# Patient Record
Sex: FEMALE | Race: WHITE | NOT HISPANIC OR LATINO
[De-identification: names, ages, dates, MRNs, and addresses within clinical notes are randomized per-mention and may not be internally consistent; named-entity substitution may affect disease eponyms.]

---

## 2017-04-04 ENCOUNTER — APPOINTMENT (OUTPATIENT)
Dept: ORTHOPEDIC SURGERY | Facility: CLINIC | Age: 52
End: 2017-04-04

## 2017-04-04 VITALS
HEIGHT: 62 IN | WEIGHT: 126 LBS | SYSTOLIC BLOOD PRESSURE: 159 MMHG | BODY MASS INDEX: 23.19 KG/M2 | HEART RATE: 76 BPM | DIASTOLIC BLOOD PRESSURE: 106 MMHG

## 2017-04-04 DIAGNOSIS — M25.571 PAIN IN RIGHT ANKLE AND JOINTS OF RIGHT FOOT: ICD-10-CM

## 2017-04-04 DIAGNOSIS — M21.6X9 OTHER ACQUIRED DEFORMITIES OF UNSPECIFIED FOOT: ICD-10-CM

## 2017-04-17 ENCOUNTER — OUTPATIENT (OUTPATIENT)
Dept: OUTPATIENT SERVICES | Facility: HOSPITAL | Age: 52
LOS: 1 days | End: 2017-04-17
Payer: MEDICARE

## 2017-04-17 ENCOUNTER — APPOINTMENT (OUTPATIENT)
Dept: MRI IMAGING | Facility: CLINIC | Age: 52
End: 2017-04-17

## 2017-04-17 DIAGNOSIS — M25.571 PAIN IN RIGHT ANKLE AND JOINTS OF RIGHT FOOT: ICD-10-CM

## 2017-04-17 PROCEDURE — 73721 MRI JNT OF LWR EXTRE W/O DYE: CPT

## 2017-05-02 ENCOUNTER — APPOINTMENT (OUTPATIENT)
Dept: ORTHOPEDIC SURGERY | Facility: CLINIC | Age: 52
End: 2017-05-02

## 2017-05-16 ENCOUNTER — OUTPATIENT (OUTPATIENT)
Dept: OUTPATIENT SERVICES | Facility: HOSPITAL | Age: 52
LOS: 1 days | End: 2017-05-16
Payer: MEDICARE

## 2017-05-16 ENCOUNTER — APPOINTMENT (OUTPATIENT)
Dept: RADIOLOGY | Facility: CLINIC | Age: 52
End: 2017-05-16

## 2017-05-16 DIAGNOSIS — M25.50 PAIN IN UNSPECIFIED JOINT: ICD-10-CM

## 2017-05-16 DIAGNOSIS — R53.82 CHRONIC FATIGUE, UNSPECIFIED: ICD-10-CM

## 2017-05-16 DIAGNOSIS — M79.7 FIBROMYALGIA: ICD-10-CM

## 2017-05-16 DIAGNOSIS — R74.8 ABNORMAL LEVELS OF OTHER SERUM ENZYMES: ICD-10-CM

## 2017-05-16 DIAGNOSIS — M15.0 PRIMARY GENERALIZED (OSTEO)ARTHRITIS: ICD-10-CM

## 2017-05-16 PROCEDURE — 77080 DXA BONE DENSITY AXIAL: CPT

## 2017-05-25 ENCOUNTER — APPOINTMENT (OUTPATIENT)
Dept: ORTHOPEDIC SURGERY | Facility: CLINIC | Age: 52
End: 2017-05-25

## 2017-05-25 VITALS
DIASTOLIC BLOOD PRESSURE: 98 MMHG | SYSTOLIC BLOOD PRESSURE: 146 MMHG | HEART RATE: 86 BPM | WEIGHT: 125 LBS | BODY MASS INDEX: 23 KG/M2 | HEIGHT: 62 IN

## 2017-05-25 DIAGNOSIS — M19.012 PRIMARY OSTEOARTHRITIS, LEFT SHOULDER: ICD-10-CM

## 2017-09-26 ENCOUNTER — APPOINTMENT (OUTPATIENT)
Dept: ULTRASOUND IMAGING | Facility: CLINIC | Age: 52
End: 2017-09-26
Payer: MEDICARE

## 2017-10-17 ENCOUNTER — APPOINTMENT (OUTPATIENT)
Dept: ULTRASOUND IMAGING | Facility: CLINIC | Age: 52
End: 2017-10-17
Payer: MEDICARE

## 2017-10-17 ENCOUNTER — APPOINTMENT (OUTPATIENT)
Dept: MAMMOGRAPHY | Facility: CLINIC | Age: 52
End: 2017-10-17
Payer: MEDICARE

## 2017-10-17 ENCOUNTER — OUTPATIENT (OUTPATIENT)
Dept: OUTPATIENT SERVICES | Facility: HOSPITAL | Age: 52
LOS: 1 days | End: 2017-10-17
Payer: MEDICARE

## 2017-10-17 DIAGNOSIS — Z12.31 ENCOUNTER FOR SCREENING MAMMOGRAM FOR MALIGNANT NEOPLASM OF BREAST: ICD-10-CM

## 2017-10-17 DIAGNOSIS — R92.2 INCONCLUSIVE MAMMOGRAM: ICD-10-CM

## 2017-10-17 DIAGNOSIS — Z00.8 ENCOUNTER FOR OTHER GENERAL EXAMINATION: ICD-10-CM

## 2017-10-17 PROCEDURE — 76641 ULTRASOUND BREAST COMPLETE: CPT | Mod: 26,50

## 2017-10-17 PROCEDURE — 77063 BREAST TOMOSYNTHESIS BI: CPT | Mod: 26

## 2017-10-17 PROCEDURE — 77063 BREAST TOMOSYNTHESIS BI: CPT

## 2017-10-17 PROCEDURE — G0202: CPT | Mod: 26

## 2017-10-17 PROCEDURE — 77067 SCR MAMMO BI INCL CAD: CPT

## 2017-10-17 PROCEDURE — 76641 ULTRASOUND BREAST COMPLETE: CPT

## 2017-10-23 DIAGNOSIS — Z12.31 ENCOUNTER FOR SCREENING MAMMOGRAM FOR MALIGNANT NEOPLASM OF BREAST: ICD-10-CM

## 2017-10-23 DIAGNOSIS — N63.20 UNSPECIFIED LUMP IN THE LEFT BREAST, UNSPECIFIED QUADRANT: ICD-10-CM

## 2017-10-24 ENCOUNTER — APPOINTMENT (OUTPATIENT)
Dept: ORTHOPEDIC SURGERY | Facility: CLINIC | Age: 52
End: 2017-10-24
Payer: MEDICARE

## 2017-10-24 PROCEDURE — 73610 X-RAY EXAM OF ANKLE: CPT | Mod: RT

## 2017-10-24 PROCEDURE — 99214 OFFICE O/P EST MOD 30 MIN: CPT

## 2017-11-17 ENCOUNTER — OUTPATIENT (OUTPATIENT)
Dept: OUTPATIENT SERVICES | Facility: HOSPITAL | Age: 52
LOS: 1 days | End: 2017-11-17

## 2017-11-17 ENCOUNTER — APPOINTMENT (OUTPATIENT)
Dept: ULTRASOUND IMAGING | Facility: CLINIC | Age: 52
End: 2017-11-17
Payer: MEDICARE

## 2017-11-17 ENCOUNTER — OUTPATIENT (OUTPATIENT)
Dept: OUTPATIENT SERVICES | Facility: HOSPITAL | Age: 52
LOS: 1 days | End: 2017-11-17
Payer: MEDICARE

## 2017-11-17 VITALS
SYSTOLIC BLOOD PRESSURE: 122 MMHG | OXYGEN SATURATION: 96 % | HEART RATE: 89 BPM | DIASTOLIC BLOOD PRESSURE: 78 MMHG | WEIGHT: 117.95 LBS | RESPIRATION RATE: 17 BRPM | HEIGHT: 62 IN | TEMPERATURE: 98 F

## 2017-11-17 DIAGNOSIS — M94.279 CHONDROMALACIA, UNSPECIFIED ANKLE AND JOINTS OF FOOT: ICD-10-CM

## 2017-11-17 DIAGNOSIS — F41.9 ANXIETY DISORDER, UNSPECIFIED: ICD-10-CM

## 2017-11-17 DIAGNOSIS — C44.92 SQUAMOUS CELL CARCINOMA OF SKIN, UNSPECIFIED: Chronic | ICD-10-CM

## 2017-11-17 DIAGNOSIS — R29.898 OTHER SYMPTOMS AND SIGNS INVOLVING THE MUSCULOSKELETAL SYSTEM: Chronic | ICD-10-CM

## 2017-11-17 DIAGNOSIS — M77.50 OTHER ENTHESOPATHY OF UNSPECIFIED FOOT: ICD-10-CM

## 2017-11-17 DIAGNOSIS — Z01.818 ENCOUNTER FOR OTHER PREPROCEDURAL EXAMINATION: ICD-10-CM

## 2017-11-17 DIAGNOSIS — G89.4 CHRONIC PAIN SYNDROME: ICD-10-CM

## 2017-11-17 DIAGNOSIS — M94.20 CHONDROMALACIA, UNSPECIFIED SITE: Chronic | ICD-10-CM

## 2017-11-17 DIAGNOSIS — Z00.8 ENCOUNTER FOR OTHER GENERAL EXAMINATION: ICD-10-CM

## 2017-11-17 DIAGNOSIS — M77.9 ENTHESOPATHY, UNSPECIFIED: Chronic | ICD-10-CM

## 2017-11-17 DIAGNOSIS — N80.0 ENDOMETRIOSIS OF UTERUS: Chronic | ICD-10-CM

## 2017-11-17 DIAGNOSIS — M26.51 ABNORMAL JAW CLOSURE: Chronic | ICD-10-CM

## 2017-11-17 DIAGNOSIS — M24.9 JOINT DERANGEMENT, UNSPECIFIED: ICD-10-CM

## 2017-11-17 DIAGNOSIS — E03.9 HYPOTHYROIDISM, UNSPECIFIED: ICD-10-CM

## 2017-11-17 DIAGNOSIS — Z96.9 PRESENCE OF FUNCTIONAL IMPLANT, UNSPECIFIED: ICD-10-CM

## 2017-11-17 DIAGNOSIS — G47.33 OBSTRUCTIVE SLEEP APNEA (ADULT) (PEDIATRIC): ICD-10-CM

## 2017-11-17 DIAGNOSIS — M77.9 ENTHESOPATHY, UNSPECIFIED: ICD-10-CM

## 2017-11-17 PROCEDURE — 93880 EXTRACRANIAL BILAT STUDY: CPT

## 2017-11-17 PROCEDURE — 93880 EXTRACRANIAL BILAT STUDY: CPT | Mod: 26

## 2017-11-17 PROCEDURE — G0463: CPT

## 2017-11-17 RX ORDER — MILNACIPRAN HYDROCHLORIDE 50 MG/1
1 TABLET, FILM COATED ORAL
Qty: 0 | Refills: 0 | COMMUNITY

## 2017-11-17 RX ORDER — LIDOCAINE HCL 20 MG/ML
0.2 VIAL (ML) INJECTION ONCE
Qty: 0 | Refills: 0 | Status: DISCONTINUED | OUTPATIENT
Start: 2017-11-24 | End: 2017-12-09

## 2017-11-17 RX ORDER — SODIUM CHLORIDE 9 MG/ML
3 INJECTION INTRAMUSCULAR; INTRAVENOUS; SUBCUTANEOUS EVERY 8 HOURS
Qty: 0 | Refills: 0 | Status: DISCONTINUED | OUTPATIENT
Start: 2017-11-24 | End: 2017-12-09

## 2017-11-17 NOTE — H&P PST ADULT - PROBLEM SELECTOR PLAN 1
Operative arthroscopy right ankle potential,   Microfracture technique and open removal of hardware.   Labs in chart   Pt is going for medical eval, call for note

## 2017-11-17 NOTE — H&P PST ADULT - PROBLEM SELECTOR PLAN 4
Continue all meds as ordered by pain management.   Pt reports she had a conversation with her Dr for post op pain management. Call for note. Continue all meds as ordered by pain management.   Pt reports she had a conversation with her Dr for post op pain management. Call for note. Case discussed with Dr Connors, also advised H/O TMJ , upper and lower jaw replacement. (Pt is able to open mouth)

## 2017-11-17 NOTE — H&P PST ADULT - MUSCULOSKELETAL
details… detailed exam ROM intact/normal strength/no calf tenderness no calf tenderness/normal strength/decreased ROM

## 2017-11-17 NOTE — H&P PST ADULT - PMH
Anxiety (ICD9 300.00)    Asthma (ICD9 493.90)  last episode 15 yr ago  Endometritis (ICD9 615.9)    Dot Alexander Infection (ICD9 075)    Fibromyalgia (ICD9 729.1)    Hyperlipemia (ICD9 272.4)    IBS (Irritable Bowel Syndrome) (ICD9 564.1)    Neuropathy (ICD9 355.9)    Osteopenia (ICD9 733.90)    Polyarthralgia (ICD9 719.49)    Raynauds Disease (ICD9 443.0)    Restless Leg Syndrome (ICD9 333.94)    Rheumatoid Arthritis (ICD9 714.0)    Spinal Stenosis (ICD9 724.00)    TMJ Disorder (ICD9 524.60) Anxiety (ICD9 300.00)    Asthma (ICD9 493.90)  last episode 15 yr ago  Bilateral carpal tunnel syndrome    Chronic pain syndrome    CRPS (complex regional pain syndrome), upper limb  left shoulder , elbow  Endometritis (ICD9 615.9)    Dot Alexander Infection (ICD9 075)    Fibromyalgia (ICD9 729.1)    Finger deformity, left  middle finger, s/p injury, stitches  Hyperlipemia (ICD9 272.4)    IBS (Irritable Bowel Syndrome) (ICD9 564.1)    Neuropathy (ICD9 355.9)    OA (osteoarthritis)    STEPHANI on CPAP    Osteopenia (ICD9 733.90)    Paget disease of bone    Polyarthralgia (ICD9 719.49)    Raynauds Disease (ICD9 443.0)    Restless Leg Syndrome (ICD9 333.94)    Rheumatoid Arthritis (ICD9 714.0)    Spinal Stenosis (ICD9 724.00)    TMJ Disorder (ICD9 524.60)

## 2017-11-17 NOTE — H&P PST ADULT - PSH
Arthroscopy  right Knee    Arthroscopy Left Shoulder x 2    Bartholin's Cyst (ICD9 616.2)    Carpal tunnel release    Cervical Spine Fusion    ORIF Right Tib/Fib    S/P Hysterectomy (ICD9 V88.01)    S/P Laminectomy with Spinal Fusion (ICD9 V45.4)    salpingectomy Abnormal function of jaw  s/p Arthrocenteis of right side of jaw 3/2011, Arthroplsty 2012, Removed sponge implants 2012,  Arthrocentesis 7/2012 TMJ open arthplasty w/temporalis with muscle graft ,5/2013 total joint replacement right side of upper and lower jaw  Arthroscopy  right Knee    Arthroscopy Left Shoulder x 2    Bartholin's Cyst (ICD9 616.2)    Cancer of skin, squamous cell  s/p excision floor on bottom of mouth  Carpal tunnel release    Cervical Spine Fusion    Chondromalacia    Disorder of foot  plantis frbroma removed right foot 2011  Endometriosis of cervix    Enthesopathy of foot    ORIF Right Tib/Fib    S/P Hysterectomy (ICD9 V88.01)    S/P Laminectomy with Spinal Fusion (ICD9 V45.4)    salpingectomy

## 2017-11-17 NOTE — H&P PST ADULT - HISTORY OF PRESENT ILLNESS
51 Y/O Female H/O right Tib/Fib , ankle Fx 2008, she fell of a horse. S/P ORIF . Pt reports she has chronic pain right ankle and difficulty walking. Seen by MD. S/P Right lower extremity x-ray. Presents Operative arthroscopy right ankle potential, Microfracture technique and open removal of hardware.

## 2017-11-24 ENCOUNTER — APPOINTMENT (OUTPATIENT)
Dept: ORTHOPEDIC SURGERY | Facility: HOSPITAL | Age: 52
End: 2017-11-24

## 2017-11-24 ENCOUNTER — OUTPATIENT (OUTPATIENT)
Dept: OUTPATIENT SERVICES | Facility: HOSPITAL | Age: 52
LOS: 1 days | End: 2017-11-24
Payer: MEDICARE

## 2017-11-24 VITALS
TEMPERATURE: 99 F | RESPIRATION RATE: 17 BRPM | SYSTOLIC BLOOD PRESSURE: 122 MMHG | WEIGHT: 117.95 LBS | DIASTOLIC BLOOD PRESSURE: 78 MMHG | HEART RATE: 89 BPM | OXYGEN SATURATION: 96 % | HEIGHT: 62 IN

## 2017-11-24 VITALS
HEART RATE: 72 BPM | SYSTOLIC BLOOD PRESSURE: 95 MMHG | DIASTOLIC BLOOD PRESSURE: 62 MMHG | TEMPERATURE: 98 F | OXYGEN SATURATION: 96 % | RESPIRATION RATE: 18 BRPM

## 2017-11-24 DIAGNOSIS — M94.279 CHONDROMALACIA, UNSPECIFIED ANKLE AND JOINTS OF FOOT: ICD-10-CM

## 2017-11-24 DIAGNOSIS — M26.51 ABNORMAL JAW CLOSURE: Chronic | ICD-10-CM

## 2017-11-24 DIAGNOSIS — M77.9 ENTHESOPATHY, UNSPECIFIED: Chronic | ICD-10-CM

## 2017-11-24 DIAGNOSIS — Z96.9 PRESENCE OF FUNCTIONAL IMPLANT, UNSPECIFIED: ICD-10-CM

## 2017-11-24 DIAGNOSIS — M77.50 OTHER ENTHESOPATHY OF UNSPECIFIED FOOT: ICD-10-CM

## 2017-11-24 DIAGNOSIS — M24.9 JOINT DERANGEMENT, UNSPECIFIED: ICD-10-CM

## 2017-11-24 DIAGNOSIS — N80.0 ENDOMETRIOSIS OF UTERUS: Chronic | ICD-10-CM

## 2017-11-24 DIAGNOSIS — R29.898 OTHER SYMPTOMS AND SIGNS INVOLVING THE MUSCULOSKELETAL SYSTEM: Chronic | ICD-10-CM

## 2017-11-24 DIAGNOSIS — M94.20 CHONDROMALACIA, UNSPECIFIED SITE: Chronic | ICD-10-CM

## 2017-11-24 DIAGNOSIS — C44.92 SQUAMOUS CELL CARCINOMA OF SKIN, UNSPECIFIED: Chronic | ICD-10-CM

## 2017-11-24 PROCEDURE — 76000 FLUOROSCOPY <1 HR PHYS/QHP: CPT

## 2017-11-24 PROCEDURE — 20680 REMOVAL OF IMPLANT DEEP: CPT

## 2017-11-24 PROCEDURE — 29897 ANKLE ARTHROSCOPY/SURGERY: CPT | Mod: RT

## 2017-11-24 PROCEDURE — 29881 ARTHRS KNE SRG MNISECTMY M/L: CPT | Mod: RT

## 2017-11-24 RX ORDER — LINACLOTIDE 145 UG/1
1 CAPSULE, GELATIN COATED ORAL
Qty: 0 | Refills: 0 | COMMUNITY

## 2017-11-24 RX ORDER — TRAZODONE HCL 50 MG
0 TABLET ORAL
Qty: 0 | Refills: 0 | COMMUNITY

## 2017-11-24 RX ORDER — HYDROMORPHONE HYDROCHLORIDE 2 MG/ML
1 INJECTION INTRAMUSCULAR; INTRAVENOUS; SUBCUTANEOUS
Qty: 0 | Refills: 0 | COMMUNITY

## 2017-11-24 RX ORDER — LANSOPRAZOLE 15 MG/1
0 CAPSULE, DELAYED RELEASE ORAL
Qty: 0 | Refills: 0 | COMMUNITY

## 2017-11-24 RX ORDER — GABAPENTIN 400 MG/1
1 CAPSULE ORAL
Qty: 0 | Refills: 0 | COMMUNITY

## 2017-11-24 RX ORDER — MILNACIPRAN HYDROCHLORIDE 50 MG/1
1 TABLET, FILM COATED ORAL
Qty: 0 | Refills: 0 | COMMUNITY

## 2017-11-24 RX ORDER — ONDANSETRON 8 MG/1
4 TABLET, FILM COATED ORAL ONCE
Qty: 0 | Refills: 0 | Status: COMPLETED | OUTPATIENT
Start: 2017-11-24 | End: 2017-11-24

## 2017-11-24 RX ORDER — CHOLECALCIFEROL (VITAMIN D3) 125 MCG
1 CAPSULE ORAL
Qty: 0 | Refills: 0 | COMMUNITY

## 2017-11-24 RX ORDER — AZITHROMYCIN 500 MG/1
1 TABLET, FILM COATED ORAL
Qty: 0 | Refills: 0 | COMMUNITY

## 2017-11-24 RX ORDER — LEVOTHYROXINE SODIUM 125 MCG
1 TABLET ORAL
Qty: 0 | Refills: 0 | COMMUNITY

## 2017-11-24 RX ORDER — MONTELUKAST 4 MG/1
1 TABLET, CHEWABLE ORAL
Qty: 0 | Refills: 0 | COMMUNITY

## 2017-11-24 RX ORDER — HYDROMORPHONE HYDROCHLORIDE 2 MG/ML
0.25 INJECTION INTRAMUSCULAR; INTRAVENOUS; SUBCUTANEOUS
Qty: 0 | Refills: 0 | Status: DISCONTINUED | OUTPATIENT
Start: 2017-11-24 | End: 2017-11-24

## 2017-11-24 RX ORDER — ROPINIROLE 8 MG/1
2 TABLET, FILM COATED, EXTENDED RELEASE ORAL
Qty: 0 | Refills: 0 | COMMUNITY

## 2017-11-24 RX ORDER — SULINDAC 200 MG/1
1 TABLET ORAL
Qty: 0 | Refills: 0 | COMMUNITY

## 2017-11-24 RX ORDER — MORPHINE SULFATE 50 MG/1
1 CAPSULE, EXTENDED RELEASE ORAL
Qty: 0 | Refills: 0 | COMMUNITY

## 2017-11-24 RX ORDER — ACETAMINOPHEN 500 MG
1000 TABLET ORAL ONCE
Qty: 0 | Refills: 0 | Status: COMPLETED | OUTPATIENT
Start: 2017-11-24 | End: 2017-11-24

## 2017-11-24 RX ADMIN — Medication 400 MILLIGRAM(S): at 10:50

## 2017-11-24 RX ADMIN — ONDANSETRON 4 MILLIGRAM(S): 8 TABLET, FILM COATED ORAL at 10:24

## 2017-11-24 RX ADMIN — HYDROMORPHONE HYDROCHLORIDE 0.25 MILLIGRAM(S): 2 INJECTION INTRAMUSCULAR; INTRAVENOUS; SUBCUTANEOUS at 10:33

## 2017-11-24 RX ADMIN — HYDROMORPHONE HYDROCHLORIDE 0.25 MILLIGRAM(S): 2 INJECTION INTRAMUSCULAR; INTRAVENOUS; SUBCUTANEOUS at 10:23

## 2017-11-24 NOTE — ASU DISCHARGE PLAN (ADULT/PEDIATRIC). - MEDICATION SUMMARY - MEDICATIONS TO TAKE
I will START or STAY ON the medications listed below when I get home from the hospital:    Marijuana 0.03 liquid 2x day 2-3 puff at bedtime for chronic pain  -- Indication: For home medication    Percocet 5/325 oral tablet  -- 1 tab(s) by mouth every 6 hours MDD:4 tabs  -- Caution federal law prohibits the transfer of this drug to any person other  than the person for whom it was prescribed.  May cause drowsiness.  Alcohol may intensify this effect.  Use care when operating dangerous machinery.  This prescription cannot be refilled.  This product contains acetaminophen.  Do not use  with any other product containing acetaminophen to prevent possible liver damage.  Using more of this medication than prescribed may cause serious breathing problems.    -- Indication: For severe pain    aspirin 81 mg oral tablet  -- 1 tab(s) by mouth once a day  -- Indication: For home medication    sulindac 150 mg oral tablet  -- 1 tab(s) by mouth 2 times a day  -- Indication: For home medication    morphine 60 mg/12 hours oral tablet, extended release  -- 1 tab(s) by mouth every 12 hours  -- Indication: For home medication    HYDROmorphone 8 mg oral tablet  -- 1 tab(s) by mouth 3 times a day  -- Indication: For home medication    gabapentin 600 mg oral tablet  -- 1 tab(s) by mouth 4 times a day  -- Indication: For home medication    milnacipran 100 mg oral tablet  -- 1 tab(s) by mouth once a day  -- Indication: For home medication    traZODone 150 mg oral tablet, extended release  -- orally once a day (at bedtime)  -- Indication: For home medication    Benadryl 25 mg oral tablet  -- 1 tab(s) by mouth once a day, As Needed  -- Indication: For home medication    atorvastatin 80 mg oral tablet  -- 1 tab(s) by mouth once a day  -- Indication: For home medication    rOPINIRole 2 mg oral tablet  -- 2 tab(s) by mouth once a day (at bedtime)  -- Indication: For home medication    ALPRAZolam 1 mg oral tablet  -- 1 tab(s) by mouth 3 times a day  -- Indication: For home medication    Dulera 100 mcg-5 mcg/inh inhalation aerosol  -- 2 puff(s) inhaled 2 times a day, As Needed  -- Indication: For home medication    diclofenac 1% topical gel  -- 2-3 day  -- Indication: For home medication    Linzess 290 mcg oral capsule  -- 1 cap(s) by mouth once a day  -- Indication: For home medication    montelukast 10 mg oral tablet  -- 1 tab(s) by mouth once a day (at bedtime)  -- Indication: For home medication    Prevacid 15 mg oral granule for reconstitution  -- orally once a day, As Needed  -- Indication: For home medication    levothyroxine 25 mcg (0.025 mg) oral tablet  -- 1 tab(s) by mouth once a day  -- Indication: For home medication    Allegra-D 12 Hour 60 mg-120 mg oral tablet, extended release  -- 1 tab(s) by mouth every 12 hours, As Needed  -- Indication: For home medication    Vitamin D3 2000 intl units oral tablet  -- 1 tab(s) by mouth once a day  -- Indication: For home medication

## 2017-11-24 NOTE — BRIEF OPERATIVE NOTE - POST-OP DX
Ankle impingement syndrome, right  11/24/2017    Catherine Beaulieu  Pain from implanted hardware, sequela  11/24/2017    Catherine Beaulieu  Post-traumatic osteoarthritis of right ankle  11/24/2017    Catherine Beaulieu

## 2017-11-24 NOTE — ASU DISCHARGE PLAN (ADULT/PEDIATRIC). - NOTIFY
Numbness, color, or temperature change to extremity/Pain not relieved by Medications/Bleeding that does not stop/Swelling that continues/Persistent Nausea and Vomiting/Inability to Tolerate Liquids or Foods/Fever greater than 101

## 2017-11-24 NOTE — ASU DISCHARGE PLAN (ADULT/PEDIATRIC). - SPECIAL INSTRUCTIONS
You are to be non-weight baring on crutches until you see Dr. Wise in the office in 10-14 days.     You can take Tylenol in addition to Percocet, but are to take NO MORE THAN 8 (325MG TABS) per day while taking Percocet. Percocet contains Tylenol and taking too much can do damage to your liver.    Take medications as prescribed.

## 2017-11-24 NOTE — BRIEF OPERATIVE NOTE - PRE-OP DX
Ankle impingement syndrome, right  11/24/2017    Active  Catherine Lozoya  Post-traumatic osteoarthritis of right ankle  11/24/2017    Catherine Beauleiu

## 2017-11-24 NOTE — ASU PATIENT PROFILE, ADULT - PSH
Abnormal function of jaw  s/p Arthrocenteis of right side of jaw 3/2011, Arthroplsty 2012, Removed sponge implants 2012,  Arthrocentesis 7/2012 TMJ open arthplasty w/temporalis with muscle graft ,5/2013 total joint replacement right side of upper and lower jaw  Arthroscopy  right Knee    Arthroscopy Left Shoulder x 2    Bartholin's Cyst (ICD9 616.2)    Cancer of skin, squamous cell  s/p excision floor on bottom of mouth  Carpal tunnel release    Cervical Spine Fusion    Chondromalacia    Disorder of foot  plantis frbroma removed right foot 2011  Endometriosis of cervix    Enthesopathy of foot    ORIF Right Tib/Fib    S/P Hysterectomy (ICD9 V88.01)    S/P Laminectomy with Spinal Fusion (ICD9 V45.4)    salpingectomy

## 2017-11-24 NOTE — ASU PATIENT PROFILE, ADULT - PMH
Anxiety (ICD9 300.00)    Asthma (ICD9 493.90)  last episode 15 yr ago  Bilateral carpal tunnel syndrome    Chronic pain syndrome    CRPS (complex regional pain syndrome), upper limb  left shoulder , elbow  Endometritis (ICD9 615.9)    Dot Alexander Infection (ICD9 075)    Fibromyalgia (ICD9 729.1)    Finger deformity, left  middle finger, s/p injury, stitches  Hyperlipemia (ICD9 272.4)    IBS (Irritable Bowel Syndrome) (ICD9 564.1)    Neuropathy (ICD9 355.9)    OA (osteoarthritis)    STEPHANI on CPAP    Osteopenia (ICD9 733.90)    Paget disease of bone    Polyarthralgia (ICD9 719.49)    Raynauds Disease (ICD9 443.0)    Restless Leg Syndrome (ICD9 333.94)    Rheumatoid Arthritis (ICD9 714.0)    Spinal Stenosis (ICD9 724.00)    TMJ Disorder (ICD9 524.60)

## 2017-11-24 NOTE — BRIEF OPERATIVE NOTE - PROCEDURE
<<-----Click on this checkbox to enter Procedure Ankle arthroscopy  11/24/2017    Active  [facility]  Removal of hardware from ankle  11/24/2017    Active  [facility]

## 2017-11-27 ENCOUNTER — TRANSCRIPTION ENCOUNTER (OUTPATIENT)
Age: 52
End: 2017-11-27

## 2017-12-05 ENCOUNTER — APPOINTMENT (OUTPATIENT)
Dept: ORTHOPEDIC SURGERY | Facility: CLINIC | Age: 52
End: 2017-12-05
Payer: MEDICARE

## 2017-12-05 DIAGNOSIS — Z96.9 PRESENCE OF FUNCTIONAL IMPLANT, UNSPECIFIED: ICD-10-CM

## 2017-12-05 PROCEDURE — 99024 POSTOP FOLLOW-UP VISIT: CPT

## 2017-12-05 PROCEDURE — 73610 X-RAY EXAM OF ANKLE: CPT | Mod: RT

## 2017-12-13 ENCOUNTER — RX RENEWAL (OUTPATIENT)
Age: 52
End: 2017-12-13

## 2017-12-25 PROBLEM — Z96.9 RETAINED ORTHOPEDIC HARDWARE: Status: ACTIVE | Noted: 2017-10-24

## 2018-01-09 ENCOUNTER — APPOINTMENT (OUTPATIENT)
Dept: ORTHOPEDIC SURGERY | Facility: CLINIC | Age: 53
End: 2018-01-09
Payer: MEDICARE

## 2018-01-09 PROCEDURE — 99024 POSTOP FOLLOW-UP VISIT: CPT

## 2018-01-15 ENCOUNTER — EMERGENCY (EMERGENCY)
Facility: HOSPITAL | Age: 53
LOS: 1 days | Discharge: HOME | End: 2018-01-15
Attending: EMERGENCY MEDICINE | Admitting: EMERGENCY MEDICINE
Payer: MEDICARE

## 2018-01-15 VITALS
HEART RATE: 85 BPM | OXYGEN SATURATION: 98 % | RESPIRATION RATE: 18 BRPM | WEIGHT: 115.96 LBS | DIASTOLIC BLOOD PRESSURE: 66 MMHG | TEMPERATURE: 99 F | SYSTOLIC BLOOD PRESSURE: 114 MMHG

## 2018-01-15 VITALS
DIASTOLIC BLOOD PRESSURE: 75 MMHG | SYSTOLIC BLOOD PRESSURE: 123 MMHG | OXYGEN SATURATION: 99 % | TEMPERATURE: 98 F | RESPIRATION RATE: 18 BRPM | HEART RATE: 81 BPM

## 2018-01-15 DIAGNOSIS — C44.92 SQUAMOUS CELL CARCINOMA OF SKIN, UNSPECIFIED: Chronic | ICD-10-CM

## 2018-01-15 DIAGNOSIS — N80.0 ENDOMETRIOSIS OF UTERUS: Chronic | ICD-10-CM

## 2018-01-15 DIAGNOSIS — M77.9 ENTHESOPATHY, UNSPECIFIED: Chronic | ICD-10-CM

## 2018-01-15 DIAGNOSIS — R29.898 OTHER SYMPTOMS AND SIGNS INVOLVING THE MUSCULOSKELETAL SYSTEM: Chronic | ICD-10-CM

## 2018-01-15 DIAGNOSIS — M26.51 ABNORMAL JAW CLOSURE: Chronic | ICD-10-CM

## 2018-01-15 DIAGNOSIS — M94.20 CHONDROMALACIA, UNSPECIFIED SITE: Chronic | ICD-10-CM

## 2018-01-15 PROCEDURE — 99284 EMERGENCY DEPT VISIT MOD MDM: CPT

## 2018-01-15 PROCEDURE — 99282 EMERGENCY DEPT VISIT SF MDM: CPT

## 2018-01-15 RX ORDER — MOMETASONE FUROATE AND FORMOTEROL FUMARATE DIHYDRATE 200; 5 UG/1; UG/1
2 AEROSOL RESPIRATORY (INHALATION)
Qty: 0 | Refills: 0 | COMMUNITY

## 2018-01-15 RX ORDER — MILNACIPRAN HYDROCHLORIDE 50 MG/1
0 TABLET, FILM COATED ORAL
Qty: 0 | Refills: 0 | COMMUNITY

## 2018-01-15 RX ORDER — FEXOFENADINE HCL AND PSEUDOEPHEDRINE HCI 60; 120 MG/1; MG/1
1 TABLET, EXTENDED RELEASE ORAL
Qty: 0 | Refills: 0 | COMMUNITY

## 2018-01-15 RX ORDER — DIPHENHYDRAMINE HCL 50 MG
1 CAPSULE ORAL
Qty: 0 | Refills: 0 | COMMUNITY

## 2018-01-15 RX ORDER — DICLOFENAC SODIUM 30 MG/G
0 GEL TOPICAL
Qty: 0 | Refills: 0 | COMMUNITY

## 2018-01-15 RX ORDER — ATORVASTATIN CALCIUM 80 MG/1
1 TABLET, FILM COATED ORAL
Qty: 0 | Refills: 0 | COMMUNITY

## 2018-01-15 RX ORDER — ALPRAZOLAM 0.25 MG
1 TABLET ORAL
Qty: 0 | Refills: 0 | COMMUNITY

## 2018-01-15 RX ORDER — ASPIRIN/CALCIUM CARB/MAGNESIUM 324 MG
1 TABLET ORAL
Qty: 0 | Refills: 0 | COMMUNITY

## 2018-01-15 NOTE — ED PROVIDER NOTE - PHYSICAL EXAMINATION
GEN: Well Appearing, Nontoxic, NAD  HEENT: Symm Facies, PERRL, EOMI, MMM, posterior pharynx clear  CV: No JVD/Bruits or stridor;  RRR w/o m/g/r  RESP: CTAB w/o w/r/r  ABD: Soft, nt/nd, +bs, no guarding/rebound, no RUQ tender;  No CVAT  EXT: No lower extremity edema or calf tenderness. WWP, palpable pulses. FROMx4  SKIN: less than 1cm round open wound without pus drainage. no erythema, edema, or warmth to surrounding area. mild ttp of around surrounding wound.   Neuro: Grossly intact, AOX3 with normal speech, CN II-XII intact; Sensation intact, motor 5/5 throughout; gait normal

## 2018-01-15 NOTE — ED ADULT NURSE NOTE - OBJECTIVE STATEMENT
52 y.o. Female presents to the ED for discharge from surgical site on R medial ankle. A&Ox3. Ambulatory to room. Hx osteoporosis, hyperlipidemia, fibromyalgia with chronic pain and fatigue, degenerative arthritis, paget's disease. As per pt, she had surgery on her R ankle on 11/24/17. Pt reports noticing pus out of surgical wound on R medial ankle since yesterday and this morning - cleaned it with "peroxide." Small scab noted on R medial ankle - no pus or bleeding noted. R ankle appears more swollen than L ankle. Tender on R medial ankle. Denies CP, SOB, N/V/D, urinary/bowel complications, fever/chills. Pt is in no current distress. Comfort and safety provided. Will continue to monitor. Awaiting ED MD consult.

## 2018-01-15 NOTE — ED PROVIDER NOTE - OBJECTIVE STATEMENT
53 y/o F pmhx tib/fib ankle fracture 2008 s/p fall off horse repaired with ORIF, 11/24/17 had hardware removed secondary to worsening pain, presenting with concern for area of pus drainage at medial ankle that started last night. Pt states that she has been doing well with decrease of pain since her surgery until last night she noted the opening of a small scab at the medial ankle that she was able to express a large amount of pus from. She reports that she noted a small amount of swelling here as well, though no pus drainage at this time. Denies fevers, chills, nausea, vomiting.

## 2018-01-15 NOTE — ED PROVIDER NOTE - CARE PLAN
Principal Discharge DX:	Wound  Instructions for follow-up, activity and diet:	1. Take ibuprofen or tylenol for pain and keep extremity elevated if you feel it is swollen   2. See Dr. Wise this week for reevaluation   3. Return to the ER for any new or worsening symptoms Principal Discharge DX:	Wound  Assessment and plan of treatment:	1. Take ibuprofen or tylenol for pain and keep extremity elevated if you feel it is swollen   2. See Dr. Wise this week for reevaluation   3. Return to the ER for any new or worsening symptoms

## 2018-01-15 NOTE — CONSULT NOTE ADULT - ASSESSMENT
A/P: 52y Female with right suture abscess    Pain control  fu xray foot/ankle  labs cancelled as per ED  wbat RLE  keep dressing clean dry and intact  remove in 2day w/ dry dressing PRN  no acute surgical intervention  c/w PO abx as per ED  Follow up with Dr. Wise within 1 week, call office for appointment  Ortho stable

## 2018-01-15 NOTE — ED ADULT NURSE REASSESSMENT NOTE - NS ED NURSE REASSESS COMMENT FT1
pt is in no current distress. comfort and safety provided. will continue to monitor. Awaiting pt's ortho callback.

## 2018-01-15 NOTE — ED PROVIDER NOTE - MEDICAL DECISION MAKING DETAILS
51 y/o F s/p ORIF 2008 s/p removal of hardware 11/2017 presenting with small, open wound to medial malleolus without pus drainage or cellulitic surrounding area. No pus expression now. no fever or chills. full ROM of ankle without concern. Will contact patient's orthopedist and reassess.

## 2018-01-15 NOTE — ED PROVIDER NOTE - ATTENDING CONTRIBUTION TO CARE
53 y/o female with a h/o ORIF of her R ankle several years ago, s/p hardware removal in 11/2017 and who presents with a cc of having expressed pus from a scabbed wound on her R medial malleolus. She also reports some pain of the R ankle but has had no associated fever, chills, redness, warmth or decreased ROM. On exam, she appears very well and comfortable. VSs noted, neck supple, lungs CTA, cardiac sounds s/ audible m/r/g, abdomen soft, NT/ND, extremities/skin c/ dry, clean based wound at R medial malleolus s/ surrounding erythema, edema, warmth, tenderness, crepitus or drainage and peripheral pulses intact and symmetric. There is nothing clinically evident at this time to suggest any emergent process, be it infectious, vascular or traumatic. We will reach out to the Orthopedist who referred her to the ED today and treat/dispo accordingly.

## 2018-01-15 NOTE — ED PROVIDER NOTE - PROGRESS NOTE DETAILS
call placed to Dr. Wise to discuss with him plan for patient. pending discussion. -Debbie Bonds PA-C ortho resident came to see patient at bedside, dressed wound and stated nothing needs to be done further at this time. agrees superficial small abscess without cellulitis with no fever or chills, no need for abx therapy. patient will see Dr. Wise this week and is stable for d/c. -Debbie Bonds PA-C ortho resident came to see patient at bedside, dressed wound and stated nothing needs to be done further at this time. agrees superficial small abscess without cellulitis with no fever or chills. recommending clindamycin for a few days for suture abscess. patient will see Dr. Wise this week and is stable for d/c. -Debbie Bonds PA-C

## 2018-01-15 NOTE — ED PROVIDER NOTE - PLAN OF CARE
1. Take ibuprofen or tylenol for pain and keep extremity elevated if you feel it is swollen   2. See Dr. Wise this week for reevaluation   3. Return to the ER for any new or worsening symptoms

## 2018-01-15 NOTE — ED PROVIDER NOTE - NS ED ROS FT
Constitutional: No fever or chills  Eyes: No visual changes, eye pain or redness  HEENT: No throat pain, ear pain, nasal pain. No nose bleeding.  CV: No chest pain or lower extremity edema  Resp: No SOB no cough  GI: No abd pain. No nausea or vomiting. No diarrhea. No constipation.   : No dysuria, hematuria.   MSK: No musculoskeletal pain  Skin: medial ankle with small open wound without redness to skin.   Neuro: No headache. No numbness or tingling. No weakness.

## 2018-01-15 NOTE — CONSULT NOTE ADULT - SUBJECTIVE AND OBJECTIVE BOX
52y Female community ambulatory presents c/o med R ankle wound. pt saw dr ponce in office last week and was told to observe. per pt she was told to come to ER because of worsening wound. she is sp EDYTA right ankle w/ arthroscopy on 11/24/17.  denies fevers or chills or any new injury. Denies numbness/tingling. No other pain/injuries.     PAST MEDICAL & SURGICAL HISTORY:  Paget disease of bone  Finger deformity, left: middle finger, s/p injury, stitches  CRPS (complex regional pain syndrome), upper limb: left shoulder , elbow  STEPHANI on CPAP  Bilateral carpal tunnel syndrome  OA (osteoarthritis)  Chronic pain syndrome  Polyarthralgia (ICD9 719.49)  Endometritis (ICD9 615.9)  Spinal Stenosis (ICD9 724.00)  TMJ Disorder (ICD9 524.60)  Asthma (ICD9 493.90): last episode 15 yr ago  IBS (Irritable Bowel Syndrome) (ICD9 564.1)  Neuropathy (ICD9 355.9)  Raynauds Disease (ICD9 443.0)  Restless Leg Syndrome (ICD9 333.94)  Osteopenia (ICD9 733.90)  Rheumatoid Arthritis (ICD9 714.0)  Dot Alexander Infection (ICD9 075)  Fibromyalgia (ICD9 729.1)  Hyperlipemia (ICD9 272.4)  Anxiety (ICD9 300.00)  Chondromalacia  Enthesopathy of foot  Abnormal function of jaw: s/p Arthrocenteis of right side of jaw 3/2011, Arthroplsty 2012, Removed sponge implants 2012,  Arthrocentesis 7/2012 TMJ open arthplasty w/temporalis with muscle graft ,5/2013 total joint replacement right side of upper and lower jaw  Cancer of skin, squamous cell: s/p excision floor on bottom of mouth  Endometriosis of cervix  Disorder of foot: plantis frbroma removed right foot 2011  Cervical Spine Fusion  S/P Laminectomy with Spinal Fusion (ICD9 V45.4)  ORIF Right Tib/Fib  salpingectomy  S/P Hysterectomy (ICD9 V88.01)  Arthroscopy Left Shoulder x 2  Arthroscopy  right Knee  Carpal tunnel release  Bartholin's Cyst (ICD9 616.2)      MEDICATIONS  (STANDING):    Allergies    adhesives (Other)  all non-steroidal anti-inflammatories (Anaphylaxis)  apple (Anaphylaxis)  erythromycin (Anaphylaxis)  grass, mold, mildew, pollen, weeds, trees (Anaphylaxis)  Keflex (Anaphylaxis)  meclofenamate (Diarrhea)  penicillin (Anaphylaxis)  spider bites, bee stings, any insect bite (Anaphylaxis)  sulfa drugs (Anaphylaxis)  sulfamethoxazole (Anaphylaxis)  tetracycline (Anaphylaxis)    Intolerances      Vital Signs Last 24 Hrs  T(C): 36.9 (01-15-18 @ 10:19), Max: 37.3 (01-15-18 @ 09:52)  T(F): 98.5 (01-15-18 @ 10:19), Max: 99.1 (01-15-18 @ 09:52)  HR: 81 (01-15-18 @ 10:19) (81 - 85)  BP: 123/75 (01-15-18 @ 10:19) (114/66 - 123/75)  BP(mean): --  RR: 18 (01-15-18 @ 10:19) (18 - 18)  SpO2: 99% (01-15-18 @ 10:19) (98% - 99%)    Physical Exam  Gen: Nad  R LE: small superficial wound over medial mal, no exposed bone, no erythema or discharge, no swelling, well healed scar from aforementioned surgery,  no TTP medial/lateral malleolus, no bony ttp elsewhere, no pain with micromotion of ankle, pt able to ambulate on ankle, +ehl/fhl/ta/gs function, dp/pt pulse intact, silt l3-s1, compartments soft/compressive, extremity warm/well perfused    Procedure: - xeroform and Tegaderm dressing placed

## 2018-01-18 ENCOUNTER — APPOINTMENT (OUTPATIENT)
Dept: ORTHOPEDIC SURGERY | Facility: CLINIC | Age: 53
End: 2018-01-18
Payer: MEDICARE

## 2018-01-18 VITALS — HEART RATE: 78 BPM | SYSTOLIC BLOOD PRESSURE: 135 MMHG | DIASTOLIC BLOOD PRESSURE: 72 MMHG

## 2018-01-18 PROCEDURE — 99024 POSTOP FOLLOW-UP VISIT: CPT

## 2018-01-18 PROCEDURE — 73610 X-RAY EXAM OF ANKLE: CPT | Mod: RT

## 2018-01-30 ENCOUNTER — APPOINTMENT (OUTPATIENT)
Dept: ORTHOPEDIC SURGERY | Facility: CLINIC | Age: 53
End: 2018-01-30
Payer: MEDICARE

## 2018-01-30 DIAGNOSIS — M94.279 CHONDROMALACIA, UNSPECIFIED ANKLE AND JOINTS OF FOOT: ICD-10-CM

## 2018-01-30 DIAGNOSIS — S91.001D UNSPECIFIED OPEN WOUND, RIGHT ANKLE, SUBSEQUENT ENCOUNTER: ICD-10-CM

## 2018-01-30 PROCEDURE — 99024 POSTOP FOLLOW-UP VISIT: CPT

## 2018-03-27 ENCOUNTER — APPOINTMENT (OUTPATIENT)
Dept: ORTHOPEDIC SURGERY | Facility: CLINIC | Age: 53
End: 2018-03-27
Payer: MEDICARE

## 2018-03-27 DIAGNOSIS — M24.9 JOINT DERANGEMENT, UNSPECIFIED: ICD-10-CM

## 2018-03-27 DIAGNOSIS — M25.871 OTHER SPECIFIED JOINT DISORDERS, RIGHT ANKLE AND FOOT: ICD-10-CM

## 2018-03-27 PROCEDURE — 99213 OFFICE O/P EST LOW 20 MIN: CPT

## 2018-04-01 PROBLEM — M24.9 INTERNAL DERANGEMENT OF ANKLE: Status: ACTIVE | Noted: 2017-04-04

## 2018-04-01 PROBLEM — M25.871 IMPINGEMENT SYNDROME OF RIGHT ANKLE: Status: ACTIVE | Noted: 2017-04-04

## 2018-04-19 ENCOUNTER — OUTPATIENT (OUTPATIENT)
Dept: OUTPATIENT SERVICES | Facility: HOSPITAL | Age: 53
LOS: 1 days | End: 2018-04-19
Payer: MEDICARE

## 2018-04-19 ENCOUNTER — APPOINTMENT (OUTPATIENT)
Dept: CT IMAGING | Facility: CLINIC | Age: 53
End: 2018-04-19
Payer: MEDICARE

## 2018-04-19 DIAGNOSIS — N80.0 ENDOMETRIOSIS OF UTERUS: Chronic | ICD-10-CM

## 2018-04-19 DIAGNOSIS — M77.9 ENTHESOPATHY, UNSPECIFIED: Chronic | ICD-10-CM

## 2018-04-19 DIAGNOSIS — R29.898 OTHER SYMPTOMS AND SIGNS INVOLVING THE MUSCULOSKELETAL SYSTEM: Chronic | ICD-10-CM

## 2018-04-19 DIAGNOSIS — C44.92 SQUAMOUS CELL CARCINOMA OF SKIN, UNSPECIFIED: Chronic | ICD-10-CM

## 2018-04-19 DIAGNOSIS — M94.20 CHONDROMALACIA, UNSPECIFIED SITE: Chronic | ICD-10-CM

## 2018-04-19 DIAGNOSIS — M26.51 ABNORMAL JAW CLOSURE: Chronic | ICD-10-CM

## 2018-04-19 DIAGNOSIS — Z00.8 ENCOUNTER FOR OTHER GENERAL EXAMINATION: ICD-10-CM

## 2018-04-19 PROCEDURE — G0297: CPT | Mod: 26

## 2018-04-19 PROCEDURE — G0297: CPT

## 2018-05-31 ENCOUNTER — OUTPATIENT (OUTPATIENT)
Dept: OUTPATIENT SERVICES | Facility: HOSPITAL | Age: 53
LOS: 1 days | End: 2018-05-31
Payer: MEDICARE

## 2018-05-31 ENCOUNTER — APPOINTMENT (OUTPATIENT)
Dept: NUCLEAR MEDICINE | Facility: IMAGING CENTER | Age: 53
End: 2018-05-31
Payer: MEDICARE

## 2018-05-31 DIAGNOSIS — M25.50 PAIN IN UNSPECIFIED JOINT: ICD-10-CM

## 2018-05-31 DIAGNOSIS — N80.0 ENDOMETRIOSIS OF UTERUS: Chronic | ICD-10-CM

## 2018-05-31 DIAGNOSIS — Z00.8 ENCOUNTER FOR OTHER GENERAL EXAMINATION: ICD-10-CM

## 2018-05-31 DIAGNOSIS — R79.89 OTHER SPECIFIED ABNORMAL FINDINGS OF BLOOD CHEMISTRY: ICD-10-CM

## 2018-05-31 DIAGNOSIS — M26.51 ABNORMAL JAW CLOSURE: Chronic | ICD-10-CM

## 2018-05-31 DIAGNOSIS — R29.898 OTHER SYMPTOMS AND SIGNS INVOLVING THE MUSCULOSKELETAL SYSTEM: Chronic | ICD-10-CM

## 2018-05-31 DIAGNOSIS — C44.92 SQUAMOUS CELL CARCINOMA OF SKIN, UNSPECIFIED: Chronic | ICD-10-CM

## 2018-05-31 DIAGNOSIS — M77.9 ENTHESOPATHY, UNSPECIFIED: Chronic | ICD-10-CM

## 2018-05-31 DIAGNOSIS — M94.20 CHONDROMALACIA, UNSPECIFIED SITE: Chronic | ICD-10-CM

## 2018-05-31 DIAGNOSIS — E55.9 VITAMIN D DEFICIENCY, UNSPECIFIED: ICD-10-CM

## 2018-05-31 DIAGNOSIS — M79.7 FIBROMYALGIA: ICD-10-CM

## 2018-05-31 PROCEDURE — 78306 BONE IMAGING WHOLE BODY: CPT

## 2018-05-31 PROCEDURE — A9561: CPT

## 2018-05-31 PROCEDURE — 78306 BONE IMAGING WHOLE BODY: CPT | Mod: 26

## 2018-06-01 ENCOUNTER — OUTPATIENT (OUTPATIENT)
Dept: OUTPATIENT SERVICES | Facility: HOSPITAL | Age: 53
LOS: 1 days | End: 2018-06-01
Payer: MEDICARE

## 2018-06-01 ENCOUNTER — APPOINTMENT (OUTPATIENT)
Dept: CT IMAGING | Facility: CLINIC | Age: 53
End: 2018-06-01
Payer: MEDICARE

## 2018-06-01 DIAGNOSIS — N80.0 ENDOMETRIOSIS OF UTERUS: Chronic | ICD-10-CM

## 2018-06-01 DIAGNOSIS — R63.4 ABNORMAL WEIGHT LOSS: ICD-10-CM

## 2018-06-01 DIAGNOSIS — R29.898 OTHER SYMPTOMS AND SIGNS INVOLVING THE MUSCULOSKELETAL SYSTEM: Chronic | ICD-10-CM

## 2018-06-01 DIAGNOSIS — M94.20 CHONDROMALACIA, UNSPECIFIED SITE: Chronic | ICD-10-CM

## 2018-06-01 DIAGNOSIS — C44.92 SQUAMOUS CELL CARCINOMA OF SKIN, UNSPECIFIED: Chronic | ICD-10-CM

## 2018-06-01 DIAGNOSIS — M26.51 ABNORMAL JAW CLOSURE: Chronic | ICD-10-CM

## 2018-06-01 DIAGNOSIS — M77.9 ENTHESOPATHY, UNSPECIFIED: Chronic | ICD-10-CM

## 2018-06-01 PROCEDURE — 74176 CT ABD & PELVIS W/O CONTRAST: CPT

## 2018-06-01 PROCEDURE — 74176 CT ABD & PELVIS W/O CONTRAST: CPT | Mod: 26

## 2018-06-11 ENCOUNTER — EMERGENCY (EMERGENCY)
Facility: HOSPITAL | Age: 53
LOS: 1 days | Discharge: HOME | End: 2018-06-11
Attending: EMERGENCY MEDICINE
Payer: MEDICARE

## 2018-06-11 VITALS
RESPIRATION RATE: 20 BRPM | DIASTOLIC BLOOD PRESSURE: 76 MMHG | HEART RATE: 69 BPM | SYSTOLIC BLOOD PRESSURE: 139 MMHG | TEMPERATURE: 98 F | OXYGEN SATURATION: 99 %

## 2018-06-11 VITALS
DIASTOLIC BLOOD PRESSURE: 68 MMHG | HEIGHT: 61 IN | SYSTOLIC BLOOD PRESSURE: 126 MMHG | RESPIRATION RATE: 22 BRPM | HEART RATE: 85 BPM | TEMPERATURE: 99 F | OXYGEN SATURATION: 99 % | WEIGHT: 102.07 LBS

## 2018-06-11 DIAGNOSIS — R29.898 OTHER SYMPTOMS AND SIGNS INVOLVING THE MUSCULOSKELETAL SYSTEM: Chronic | ICD-10-CM

## 2018-06-11 DIAGNOSIS — M77.9 ENTHESOPATHY, UNSPECIFIED: Chronic | ICD-10-CM

## 2018-06-11 DIAGNOSIS — M94.20 CHONDROMALACIA, UNSPECIFIED SITE: Chronic | ICD-10-CM

## 2018-06-11 DIAGNOSIS — N80.0 ENDOMETRIOSIS OF UTERUS: Chronic | ICD-10-CM

## 2018-06-11 DIAGNOSIS — C44.92 SQUAMOUS CELL CARCINOMA OF SKIN, UNSPECIFIED: Chronic | ICD-10-CM

## 2018-06-11 DIAGNOSIS — M26.51 ABNORMAL JAW CLOSURE: Chronic | ICD-10-CM

## 2018-06-11 LAB
ALBUMIN SERPL ELPH-MCNC: 4.5 G/DL — SIGNIFICANT CHANGE UP (ref 3.3–5)
ALP SERPL-CCNC: 85 U/L — SIGNIFICANT CHANGE UP (ref 40–120)
ALT FLD-CCNC: 15 U/L — SIGNIFICANT CHANGE UP (ref 10–45)
ANION GAP SERPL CALC-SCNC: 11 MMOL/L — SIGNIFICANT CHANGE UP (ref 5–17)
APPEARANCE UR: CLEAR — SIGNIFICANT CHANGE UP
AST SERPL-CCNC: 17 U/L — SIGNIFICANT CHANGE UP (ref 10–40)
BASOPHILS # BLD AUTO: 0 K/UL — SIGNIFICANT CHANGE UP (ref 0–0.2)
BASOPHILS NFR BLD AUTO: 0.5 % — SIGNIFICANT CHANGE UP (ref 0–2)
BILIRUB SERPL-MCNC: 0.5 MG/DL — SIGNIFICANT CHANGE UP (ref 0.2–1.2)
BILIRUB UR-MCNC: NEGATIVE — SIGNIFICANT CHANGE UP
BUN SERPL-MCNC: 14 MG/DL — SIGNIFICANT CHANGE UP (ref 7–23)
CALCIUM SERPL-MCNC: 9.4 MG/DL — SIGNIFICANT CHANGE UP (ref 8.4–10.5)
CHLORIDE SERPL-SCNC: 105 MMOL/L — SIGNIFICANT CHANGE UP (ref 96–108)
CO2 SERPL-SCNC: 26 MMOL/L — SIGNIFICANT CHANGE UP (ref 22–31)
COLOR SPEC: SIGNIFICANT CHANGE UP
CREAT SERPL-MCNC: 0.6 MG/DL — SIGNIFICANT CHANGE UP (ref 0.5–1.3)
DIFF PNL FLD: NEGATIVE — SIGNIFICANT CHANGE UP
EOSINOPHIL # BLD AUTO: 0.2 K/UL — SIGNIFICANT CHANGE UP (ref 0–0.5)
EOSINOPHIL NFR BLD AUTO: 3 % — SIGNIFICANT CHANGE UP (ref 0–6)
EPI CELLS # UR: SIGNIFICANT CHANGE UP /HPF
GLUCOSE SERPL-MCNC: 92 MG/DL — SIGNIFICANT CHANGE UP (ref 70–99)
GLUCOSE UR QL: NEGATIVE — SIGNIFICANT CHANGE UP
HCT VFR BLD CALC: 40.9 % — SIGNIFICANT CHANGE UP (ref 34.5–45)
HGB BLD-MCNC: 13.3 G/DL — SIGNIFICANT CHANGE UP (ref 11.5–15.5)
KETONES UR-MCNC: NEGATIVE — SIGNIFICANT CHANGE UP
LEUKOCYTE ESTERASE UR-ACNC: ABNORMAL
LIDOCAIN IGE QN: 100 U/L — HIGH (ref 7–60)
LYMPHOCYTES # BLD AUTO: 2.2 K/UL — SIGNIFICANT CHANGE UP (ref 1–3.3)
LYMPHOCYTES # BLD AUTO: 28.6 % — SIGNIFICANT CHANGE UP (ref 13–44)
MCHC RBC-ENTMCNC: 31.3 PG — SIGNIFICANT CHANGE UP (ref 27–34)
MCHC RBC-ENTMCNC: 32.6 GM/DL — SIGNIFICANT CHANGE UP (ref 32–36)
MCV RBC AUTO: 96.1 FL — SIGNIFICANT CHANGE UP (ref 80–100)
MONOCYTES # BLD AUTO: 0.4 K/UL — SIGNIFICANT CHANGE UP (ref 0–0.9)
MONOCYTES NFR BLD AUTO: 5.8 % — SIGNIFICANT CHANGE UP (ref 2–14)
NEUTROPHILS # BLD AUTO: 4.7 K/UL — SIGNIFICANT CHANGE UP (ref 1.8–7.4)
NEUTROPHILS NFR BLD AUTO: 62.1 % — SIGNIFICANT CHANGE UP (ref 43–77)
NITRITE UR-MCNC: NEGATIVE — SIGNIFICANT CHANGE UP
PH UR: 6.5 — SIGNIFICANT CHANGE UP (ref 5–8)
PLATELET # BLD AUTO: 183 K/UL — SIGNIFICANT CHANGE UP (ref 150–400)
POTASSIUM SERPL-MCNC: 4.6 MMOL/L — SIGNIFICANT CHANGE UP (ref 3.5–5.3)
POTASSIUM SERPL-SCNC: 4.6 MMOL/L — SIGNIFICANT CHANGE UP (ref 3.5–5.3)
PROT SERPL-MCNC: 7.3 G/DL — SIGNIFICANT CHANGE UP (ref 6–8.3)
PROT UR-MCNC: NEGATIVE — SIGNIFICANT CHANGE UP
RBC # BLD: 4.25 M/UL — SIGNIFICANT CHANGE UP (ref 3.8–5.2)
RBC # FLD: 12.5 % — SIGNIFICANT CHANGE UP (ref 10.3–14.5)
RBC CASTS # UR COMP ASSIST: SIGNIFICANT CHANGE UP /HPF (ref 0–2)
SODIUM SERPL-SCNC: 142 MMOL/L — SIGNIFICANT CHANGE UP (ref 135–145)
SP GR SPEC: 1 — LOW (ref 1.01–1.02)
UROBILINOGEN FLD QL: NEGATIVE — SIGNIFICANT CHANGE UP
WBC # BLD: 7.6 K/UL — SIGNIFICANT CHANGE UP (ref 3.8–10.5)
WBC # FLD AUTO: 7.6 K/UL — SIGNIFICANT CHANGE UP (ref 3.8–10.5)
WBC UR QL: SIGNIFICANT CHANGE UP /HPF (ref 0–5)

## 2018-06-11 PROCEDURE — 80053 COMPREHEN METABOLIC PANEL: CPT

## 2018-06-11 PROCEDURE — 96375 TX/PRO/DX INJ NEW DRUG ADDON: CPT

## 2018-06-11 PROCEDURE — 74177 CT ABD & PELVIS W/CONTRAST: CPT | Mod: 26

## 2018-06-11 PROCEDURE — 87086 URINE CULTURE/COLONY COUNT: CPT

## 2018-06-11 PROCEDURE — 81001 URINALYSIS AUTO W/SCOPE: CPT

## 2018-06-11 PROCEDURE — 85027 COMPLETE CBC AUTOMATED: CPT

## 2018-06-11 PROCEDURE — 99284 EMERGENCY DEPT VISIT MOD MDM: CPT | Mod: GC

## 2018-06-11 PROCEDURE — 83690 ASSAY OF LIPASE: CPT

## 2018-06-11 PROCEDURE — 96374 THER/PROPH/DIAG INJ IV PUSH: CPT | Mod: XU

## 2018-06-11 PROCEDURE — 99284 EMERGENCY DEPT VISIT MOD MDM: CPT | Mod: 25

## 2018-06-11 PROCEDURE — 74177 CT ABD & PELVIS W/CONTRAST: CPT

## 2018-06-11 RX ORDER — MORPHINE SULFATE 50 MG/1
2 CAPSULE, EXTENDED RELEASE ORAL ONCE
Qty: 0 | Refills: 0 | Status: DISCONTINUED | OUTPATIENT
Start: 2018-06-11 | End: 2018-06-11

## 2018-06-11 RX ORDER — SODIUM CHLORIDE 9 MG/ML
1000 INJECTION INTRAMUSCULAR; INTRAVENOUS; SUBCUTANEOUS ONCE
Qty: 0 | Refills: 0 | Status: COMPLETED | OUTPATIENT
Start: 2018-06-11 | End: 2018-06-11

## 2018-06-11 RX ORDER — CIPROFLOXACIN LACTATE 400MG/40ML
1 VIAL (ML) INTRAVENOUS
Qty: 7 | Refills: 0 | OUTPATIENT
Start: 2018-06-11 | End: 2018-06-17

## 2018-06-11 RX ORDER — HYDROMORPHONE HYDROCHLORIDE 2 MG/ML
0.5 INJECTION INTRAMUSCULAR; INTRAVENOUS; SUBCUTANEOUS ONCE
Qty: 0 | Refills: 0 | Status: DISCONTINUED | OUTPATIENT
Start: 2018-06-11 | End: 2018-06-11

## 2018-06-11 RX ADMIN — SODIUM CHLORIDE 1000 MILLILITER(S): 9 INJECTION INTRAMUSCULAR; INTRAVENOUS; SUBCUTANEOUS at 18:40

## 2018-06-11 RX ADMIN — MORPHINE SULFATE 2 MILLIGRAM(S): 50 CAPSULE, EXTENDED RELEASE ORAL at 19:15

## 2018-06-11 RX ADMIN — HYDROMORPHONE HYDROCHLORIDE 0.5 MILLIGRAM(S): 2 INJECTION INTRAMUSCULAR; INTRAVENOUS; SUBCUTANEOUS at 20:15

## 2018-06-11 NOTE — ED PROVIDER NOTE - MEDICAL DECISION MAKING DETAILS
ddx: nephrolithaisis vs diverticulitis vs pancreatitis. Will obtain CMP, CBC, lipase, amylase, UA, urine cx. and reaccess

## 2018-06-11 NOTE — ED ADULT TRIAGE NOTE - CHIEF COMPLAINT QUOTE
L flank pain radiating to front, outpt CT: +"renal cyst", sono at office: "cyst on kidney", +cyst larger in size, +nausea

## 2018-06-11 NOTE — ED ADULT NURSE NOTE - OBJECTIVE STATEMENT
Patient  is  alert    and  oriented x3.  Color is good and skin warm  to touch.  She is c/o lt flank  pain with radiation to abdomen .   Urine is dark josé miguel.

## 2018-06-11 NOTE — ED PROVIDER NOTE - CHIEF COMPLAINT
The patient is a 53y Female complaining of The patient is a 53y Female complaining of abdominal pain

## 2018-06-11 NOTE — ED PROVIDER NOTE - ATTENDING CONTRIBUTION TO CARE
54 yo F presents with L CVA pain radiating to the L flank and L abdomen for the past few days. sharp stabbing burning intermittent pain. + nausea, no vomiting. + dark urine. fevers over the weekend, tmax 100.8. no dysuria. + urgency/frequency.   PE with + LCVAT, + L sided abdominal TTP, + L flank TTP. 54 yo F presents with L CVA pain radiating to the L flank and L abdomen for the past few days. sharp stabbing burning intermittent pain. + nausea, no vomiting. + dark urine. fevers over the weekend, tmax 100.8. no dysuria. + urgency/frequency.   PE with + LCVAT, + L sided abdominal TTP, + L flank TTP. NO RUQ or epigastric TTP.   ed workup shows UA with some evidence of infection, 6-10 WBC and moderate LE. CT with 4.3 cm renal cyst with sepatation and 2mm right lower pole hypodensity, but no kidney stone or hydronephrosis. there is also a distended GB, with dilate CBD 9mm and biliary ductal dilatation.   but patient has no R sided abdominal pain nor any right sided abdominal TTP. his LFTs are normal. therefore I do not suspected acute cholecystitis or choledocholithiasis or ascending cholagnitis. I discussed all these abnormal findings with pt and recommended f/u with PMD or GI doctor.   given L flank pain and slightly positive UA, patietn was started on levaquin abx. on pt re-eval she rerported near resolution of her pain. abd soft and NT and she is tolerating food/drink without any N/V. VS stable. patient discharged with instructions to drink fluids, motrin/tylenol for pain, course of levaquin and f/u with pmd and GI in 1-2 dyas for repeat eval/further manamgent    The patient was discharged from the ED in stable condition. All results of today's workup were discussed with the patient and all questions/concerns were addressed. All discharge instructions were thoroughly discussed with the patient, as well as important warning signs and new/ worsening symptoms which should necessitate patient's immediate return to the ED. The patient is agreeable with discharge and expresses full understanding of all instructions given.

## 2018-06-11 NOTE — ED PROVIDER NOTE - PMH
Anxiety    Anxiety (ICD9 300.00)    Asthma (ICD9 493.90)  last episode 15 yr ago  Bilateral carpal tunnel syndrome    Chronic fatigue syndrome    Chronic pain syndrome    Costochondritis    CRPS (complex regional pain syndrome), upper limb  left shoulder , elbow  Endometritis (ICD9 615.9)    Dot Alexander Infection (ICD9 075)    Fibromyalgia (ICD9 729.1)    Finger deformity, left  middle finger, s/p injury, stitches  High cholesterol    Hyperlipemia (ICD9 272.4)    Hypothyroid    IBS (Irritable Bowel Syndrome) (ICD9 564.1)    Neuropathy    Neuropathy (ICD9 355.9)    OA (osteoarthritis)    STEPHANI on CPAP    Osteopenia (ICD9 733.90)    Paget disease of bone    Polyarthralgia (ICD9 719.49)    Raynauds Disease (ICD9 443.0)    Restless leg syndrome    Restless Leg Syndrome (ICD9 333.94)    Rheumatoid Arthritis (ICD9 714.0)    Spinal Stenosis (ICD9 724.00)    TMJ disease    TMJ Disorder (ICD9 524.60)

## 2018-06-11 NOTE — ED ADULT NURSE NOTE - PMH
Anxiety (ICD9 300.00)    Asthma (ICD9 493.90)  last episode 15 yr ago  Bilateral carpal tunnel syndrome    Chronic pain syndrome    CRPS (complex regional pain syndrome), upper limb  left shoulder , elbow  Endometritis (ICD9 615.9)    Odt Alexander Infection (ICD9 075)    Fibromyalgia (ICD9 729.1)    Finger deformity, left  middle finger, s/p injury, stitches  Hyperlipemia (ICD9 272.4)    IBS (Irritable Bowel Syndrome) (ICD9 564.1)    Neuropathy (ICD9 355.9)    OA (osteoarthritis)    STEPHANI on CPAP    Osteopenia (ICD9 733.90)    Paget disease of bone    Polyarthralgia (ICD9 719.49)    Raynauds Disease (ICD9 443.0)    Restless Leg Syndrome (ICD9 333.94)    Rheumatoid Arthritis (ICD9 714.0)    Spinal Stenosis (ICD9 724.00)    TMJ Disorder (ICD9 524.60) Anxiety    Anxiety (ICD9 300.00)    Asthma (ICD9 493.90)  last episode 15 yr ago  Bilateral carpal tunnel syndrome    Chronic fatigue syndrome    Chronic pain syndrome    Costochondritis    CRPS (complex regional pain syndrome), upper limb  left shoulder , elbow  Endometritis (ICD9 615.9)    Dot Alexander Infection (ICD9 075)    Fibromyalgia (ICD9 729.1)    Finger deformity, left  middle finger, s/p injury, stitches  High cholesterol    Hyperlipemia (ICD9 272.4)    Hypothyroid    IBS (Irritable Bowel Syndrome) (ICD9 564.1)    Neuropathy    Neuropathy (ICD9 355.9)    OA (osteoarthritis)    STEPHAIN on CPAP    Osteopenia (ICD9 733.90)    Paget disease of bone    Polyarthralgia (ICD9 719.49)    Raynauds Disease (ICD9 443.0)    Restless leg syndrome    Restless Leg Syndrome (ICD9 333.94)    Rheumatoid Arthritis (ICD9 714.0)    Spinal Stenosis (ICD9 724.00)    TMJ disease    TMJ Disorder (ICD9 524.60)

## 2018-06-11 NOTE — ED PROVIDER NOTE - MUSCULOSKELETAL, MLM
Spine appears normal, range of motion is not limited, no muscle or joint tenderness. L flank tenderness.

## 2018-06-11 NOTE — ED ADULT NURSE REASSESSMENT NOTE - NS ED NURSE REASSESS COMMENT FT1
;pt pending ct results
pt at ct scan
Report received from    MOE Ahmadi  Pt resting with family at bedside.   medicated as per MD orders  Safety maintained at all times, bed in lowest position  Will continue to monitor closely.

## 2018-06-11 NOTE — ED PROVIDER NOTE - FAMILY HISTORY
Father  Still living? Unknown  Family history of hypertension in father, Age at diagnosis: Age Unknown  Family history of coronary artery disease, Age at diagnosis: Age Unknown  Family history of diabetes mellitus, Age at diagnosis: Age Unknown     Mother  Still living? Unknown  Family history of hypertension in father, Age at diagnosis: Age Unknown  Family history of coronary artery disease, Age at diagnosis: Age Unknown  Family history of diabetes mellitus, Age at diagnosis: Age Unknown

## 2018-06-11 NOTE — ED PROVIDER NOTE - OBJECTIVE STATEMENT
52 y/o F with multiple medical problems, including PMR, anxiety, fibromyalgia, HLD, osteoporosis, Raynause, sleep apnea, hypothyroidism, presents with L flank pain since last Wednesday. Pain radiates to LUQ and down. Pain is burning, cramping, constant 10/10. Associated with nausea. No vomiting. No diarrhea, constipation. No hx of nephrolithiasis, diverticulitis. Pt went to her PCP, and had a CT a/p which was unremarkable. Endorses fever of 100.8. No sicks contacts. Endorses urinary frequency. 52 y/o F with multiple medical problems, including PMR, anxiety, fibromyalgia, HLD, osteoporosis, Raynaud, sleep apnea, hypothyroidism, presents with L flank pain since last Wednesday. Pain radiates to LUQ and down. Pain is burning, cramping, constant 10/10. Associated with nausea. No vomiting. No diarrhea, constipation. No hx of nephrolithiasis, diverticulitis. Pt went to her PCP, and had a CT a/p which was unremarkable. Endorses fever of 100.8. No sicks contacts. Endorses urinary frequency.

## 2018-06-12 LAB
CULTURE RESULTS: NO GROWTH — SIGNIFICANT CHANGE UP
SPECIMEN SOURCE: SIGNIFICANT CHANGE UP

## 2018-06-22 ENCOUNTER — OUTPATIENT (OUTPATIENT)
Dept: OUTPATIENT SERVICES | Facility: HOSPITAL | Age: 53
LOS: 1 days | End: 2018-06-22
Payer: MEDICARE

## 2018-06-22 ENCOUNTER — APPOINTMENT (OUTPATIENT)
Dept: ULTRASOUND IMAGING | Facility: CLINIC | Age: 53
End: 2018-06-22
Payer: MEDICARE

## 2018-06-22 DIAGNOSIS — C44.92 SQUAMOUS CELL CARCINOMA OF SKIN, UNSPECIFIED: Chronic | ICD-10-CM

## 2018-06-22 DIAGNOSIS — M94.20 CHONDROMALACIA, UNSPECIFIED SITE: Chronic | ICD-10-CM

## 2018-06-22 DIAGNOSIS — Z00.8 ENCOUNTER FOR OTHER GENERAL EXAMINATION: ICD-10-CM

## 2018-06-22 DIAGNOSIS — M77.9 ENTHESOPATHY, UNSPECIFIED: Chronic | ICD-10-CM

## 2018-06-22 DIAGNOSIS — M26.51 ABNORMAL JAW CLOSURE: Chronic | ICD-10-CM

## 2018-06-22 DIAGNOSIS — R29.898 OTHER SYMPTOMS AND SIGNS INVOLVING THE MUSCULOSKELETAL SYSTEM: Chronic | ICD-10-CM

## 2018-06-22 DIAGNOSIS — N80.0 ENDOMETRIOSIS OF UTERUS: Chronic | ICD-10-CM

## 2018-06-22 PROCEDURE — 76700 US EXAM ABDOM COMPLETE: CPT | Mod: 26

## 2018-06-22 PROCEDURE — 76700 US EXAM ABDOM COMPLETE: CPT

## 2018-06-28 ENCOUNTER — APPOINTMENT (OUTPATIENT)
Dept: NUCLEAR MEDICINE | Facility: HOSPITAL | Age: 53
End: 2018-06-28

## 2018-06-28 ENCOUNTER — OUTPATIENT (OUTPATIENT)
Dept: OUTPATIENT SERVICES | Facility: HOSPITAL | Age: 53
LOS: 1 days | End: 2018-06-28
Payer: MEDICARE

## 2018-06-28 DIAGNOSIS — M77.9 ENTHESOPATHY, UNSPECIFIED: Chronic | ICD-10-CM

## 2018-06-28 DIAGNOSIS — M94.20 CHONDROMALACIA, UNSPECIFIED SITE: Chronic | ICD-10-CM

## 2018-06-28 DIAGNOSIS — R10.9 UNSPECIFIED ABDOMINAL PAIN: ICD-10-CM

## 2018-06-28 DIAGNOSIS — M26.51 ABNORMAL JAW CLOSURE: Chronic | ICD-10-CM

## 2018-06-28 DIAGNOSIS — R29.898 OTHER SYMPTOMS AND SIGNS INVOLVING THE MUSCULOSKELETAL SYSTEM: Chronic | ICD-10-CM

## 2018-06-28 DIAGNOSIS — N80.0 ENDOMETRIOSIS OF UTERUS: Chronic | ICD-10-CM

## 2018-06-28 DIAGNOSIS — C44.92 SQUAMOUS CELL CARCINOMA OF SKIN, UNSPECIFIED: Chronic | ICD-10-CM

## 2018-06-28 PROCEDURE — 78227 HEPATOBIL SYST IMAGE W/DRUG: CPT | Mod: 26

## 2018-06-28 PROCEDURE — 78227 HEPATOBIL SYST IMAGE W/DRUG: CPT

## 2018-06-28 PROCEDURE — A9537: CPT

## 2018-08-17 PROBLEM — G25.81 RESTLESS LEGS SYNDROME: Chronic | Status: ACTIVE | Noted: 2018-06-11

## 2018-08-17 PROBLEM — G47.33 OBSTRUCTIVE SLEEP APNEA (ADULT) (PEDIATRIC): Chronic | Status: ACTIVE | Noted: 2017-11-17

## 2018-08-17 PROBLEM — G89.4 CHRONIC PAIN SYNDROME: Chronic | Status: ACTIVE | Noted: 2017-11-17

## 2018-08-17 PROBLEM — G62.9 POLYNEUROPATHY, UNSPECIFIED: Chronic | Status: ACTIVE | Noted: 2018-06-11

## 2018-08-17 PROBLEM — R53.82 CHRONIC FATIGUE, UNSPECIFIED: Chronic | Status: ACTIVE | Noted: 2018-06-11

## 2018-08-17 PROBLEM — F41.9 ANXIETY DISORDER, UNSPECIFIED: Chronic | Status: ACTIVE | Noted: 2018-06-11

## 2018-08-17 PROBLEM — M26.609 UNSPECIFIED TEMPOROMANDIBULAR JOINT DISORDER, UNSPECIFIED SIDE: Chronic | Status: ACTIVE | Noted: 2018-06-11

## 2018-08-17 PROBLEM — G56.03 CARPAL TUNNEL SYNDROME, BILATERAL UPPER LIMBS: Chronic | Status: ACTIVE | Noted: 2017-11-17

## 2018-08-17 PROBLEM — M94.0 CHONDROCOSTAL JUNCTION SYNDROME [TIETZE]: Chronic | Status: ACTIVE | Noted: 2018-06-11

## 2018-08-17 PROBLEM — M88.9 OSTEITIS DEFORMANS OF UNSPECIFIED BONE: Chronic | Status: ACTIVE | Noted: 2017-11-17

## 2018-08-17 PROBLEM — E78.00 PURE HYPERCHOLESTEROLEMIA, UNSPECIFIED: Chronic | Status: ACTIVE | Noted: 2018-06-11

## 2018-08-17 PROBLEM — M19.90 UNSPECIFIED OSTEOARTHRITIS, UNSPECIFIED SITE: Chronic | Status: ACTIVE | Noted: 2017-11-17

## 2018-08-17 PROBLEM — M20.002: Chronic | Status: ACTIVE | Noted: 2017-11-17

## 2018-08-17 PROBLEM — E03.9 HYPOTHYROIDISM, UNSPECIFIED: Chronic | Status: ACTIVE | Noted: 2018-06-11

## 2018-08-17 PROBLEM — G90.519 COMPLEX REGIONAL PAIN SYNDROME I OF UNSPECIFIED UPPER LIMB: Chronic | Status: ACTIVE | Noted: 2017-11-17

## 2018-09-12 ENCOUNTER — APPOINTMENT (OUTPATIENT)
Dept: WOUND CARE | Facility: CLINIC | Age: 53
End: 2018-09-12
Payer: MEDICARE

## 2018-09-12 DIAGNOSIS — Z82.69 FAMILY HISTORY OF OTHER DISEASES OF THE MUSCULOSKELETAL SYSTEM AND CONNECTIVE TISSUE: ICD-10-CM

## 2018-09-12 DIAGNOSIS — Z82.49 FAMILY HISTORY OF ISCHEMIC HEART DISEASE AND OTHER DISEASES OF THE CIRCULATORY SYSTEM: ICD-10-CM

## 2018-09-12 DIAGNOSIS — Z86.69 PERSONAL HISTORY OF OTHER DISEASES OF THE NERVOUS SYSTEM AND SENSE ORGANS: ICD-10-CM

## 2018-09-12 DIAGNOSIS — Z87.42 PERSONAL HISTORY OF OTHER DISEASES OF THE FEMALE GENITAL TRACT: ICD-10-CM

## 2018-09-12 DIAGNOSIS — Z86.59 PERSONAL HISTORY OF OTHER MENTAL AND BEHAVIORAL DISORDERS: ICD-10-CM

## 2018-09-12 DIAGNOSIS — I89.0 LYMPHEDEMA, NOT ELSEWHERE CLASSIFIED: ICD-10-CM

## 2018-09-12 DIAGNOSIS — Z84.1 FAMILY HISTORY OF DISORDERS OF KIDNEY AND URETER: ICD-10-CM

## 2018-09-12 DIAGNOSIS — M79.89 OTHER SPECIFIED SOFT TISSUE DISORDERS: ICD-10-CM

## 2018-09-12 DIAGNOSIS — E78.00 PURE HYPERCHOLESTEROLEMIA, UNSPECIFIED: ICD-10-CM

## 2018-09-12 DIAGNOSIS — Z87.898 PERSONAL HISTORY OF OTHER SPECIFIED CONDITIONS: ICD-10-CM

## 2018-09-12 PROCEDURE — 99204 OFFICE O/P NEW MOD 45 MIN: CPT

## 2018-09-12 RX ORDER — TRAZODONE HYDROCHLORIDE 150 MG/1
150 TABLET ORAL
Refills: 0 | Status: ACTIVE | COMMUNITY

## 2018-09-12 RX ORDER — ATORVASTATIN CALCIUM 80 MG/1
80 TABLET, FILM COATED ORAL
Refills: 0 | Status: ACTIVE | COMMUNITY

## 2018-09-12 RX ORDER — ALPRAZOLAM 1 MG/1
1 TABLET ORAL
Refills: 0 | Status: ACTIVE | COMMUNITY

## 2018-09-12 RX ORDER — MONTELUKAST 10 MG/1
10 TABLET, FILM COATED ORAL
Refills: 0 | Status: ACTIVE | COMMUNITY

## 2018-09-12 RX ORDER — GABAPENTIN 600 MG/1
600 TABLET, COATED ORAL
Refills: 0 | Status: ACTIVE | COMMUNITY

## 2018-09-20 PROBLEM — M79.89 LEG SWELLING: Status: ACTIVE | Noted: 2018-09-20

## 2018-09-28 ENCOUNTER — APPOINTMENT (OUTPATIENT)
Dept: VASCULAR SURGERY | Facility: CLINIC | Age: 53
End: 2018-09-28

## 2018-09-29 ENCOUNTER — RECORD ABSTRACTING (OUTPATIENT)
Age: 53
End: 2018-09-29

## 2018-09-29 DIAGNOSIS — R91.1 SOLITARY PULMONARY NODULE: ICD-10-CM

## 2018-09-29 DIAGNOSIS — Z82.5 FAMILY HISTORY OF ASTHMA AND OTHER CHRONIC LOWER RESPIRATORY DISEASES: ICD-10-CM

## 2018-09-29 RX ORDER — LEVOTHYROXINE SODIUM 25 UG/1
25 TABLET ORAL
Refills: 0 | Status: ACTIVE | COMMUNITY

## 2018-10-05 ENCOUNTER — APPOINTMENT (OUTPATIENT)
Dept: VASCULAR SURGERY | Facility: CLINIC | Age: 53
End: 2018-10-05
Payer: MEDICARE

## 2018-10-05 PROCEDURE — 93970 EXTREMITY STUDY: CPT

## 2018-10-05 PROCEDURE — 93922 UPR/L XTREMITY ART 2 LEVELS: CPT

## 2018-10-09 ENCOUNTER — TRANSCRIPTION ENCOUNTER (OUTPATIENT)
Age: 53
End: 2018-10-09

## 2018-10-23 ENCOUNTER — APPOINTMENT (OUTPATIENT)
Dept: ORTHOPEDIC SURGERY | Facility: CLINIC | Age: 53
End: 2018-10-23
Payer: MEDICARE

## 2018-10-23 VITALS — HEIGHT: 61 IN | WEIGHT: 103 LBS | BODY MASS INDEX: 19.45 KG/M2

## 2018-10-23 VITALS — SYSTOLIC BLOOD PRESSURE: 126 MMHG | HEART RATE: 54 BPM | DIASTOLIC BLOOD PRESSURE: 74 MMHG

## 2018-10-23 DIAGNOSIS — M96.0 PSEUDARTHROSIS AFTER FUSION OR ARTHRODESIS: ICD-10-CM

## 2018-10-23 DIAGNOSIS — M54.5 LOW BACK PAIN: ICD-10-CM

## 2018-10-23 PROCEDURE — 99215 OFFICE O/P EST HI 40 MIN: CPT

## 2018-10-31 ENCOUNTER — MESSAGE (OUTPATIENT)
Age: 53
End: 2018-10-31

## 2018-11-02 PROBLEM — I89.0 LYMPHEDEMA: Status: ACTIVE | Noted: 2018-11-02

## 2018-11-13 ENCOUNTER — APPOINTMENT (OUTPATIENT)
Dept: PULMONOLOGY | Facility: CLINIC | Age: 53
End: 2018-11-13
Payer: MEDICARE

## 2018-11-13 VITALS
OXYGEN SATURATION: 95 % | WEIGHT: 100 LBS | TEMPERATURE: 97.8 F | RESPIRATION RATE: 16 BRPM | BODY MASS INDEX: 18.88 KG/M2 | HEART RATE: 72 BPM | DIASTOLIC BLOOD PRESSURE: 66 MMHG | HEIGHT: 61 IN | SYSTOLIC BLOOD PRESSURE: 105 MMHG

## 2018-11-13 DIAGNOSIS — Z23 ENCOUNTER FOR IMMUNIZATION: ICD-10-CM

## 2018-11-13 DIAGNOSIS — M25.50 PAIN IN UNSPECIFIED JOINT: ICD-10-CM

## 2018-11-13 PROCEDURE — 99214 OFFICE O/P EST MOD 30 MIN: CPT | Mod: 25

## 2018-11-13 PROCEDURE — 94727 GAS DIL/WSHOT DETER LNG VOL: CPT

## 2018-11-13 PROCEDURE — 90732 PPSV23 VACC 2 YRS+ SUBQ/IM: CPT

## 2018-11-13 PROCEDURE — 88738 HGB QUANT TRANSCUTANEOUS: CPT

## 2018-11-13 PROCEDURE — G0009: CPT

## 2018-11-13 PROCEDURE — 94729 DIFFUSING CAPACITY: CPT

## 2018-11-13 RX ORDER — PRAMIPEXOLE DIHYDROCHLORIDE 0.5 MG/1
0.5 TABLET ORAL
Refills: 0 | Status: ACTIVE | COMMUNITY
Start: 2018-11-13

## 2018-11-13 RX ORDER — OXYCODONE AND ACETAMINOPHEN 5; 325 MG/1; MG/1
5-325 TABLET ORAL
Qty: 15 | Refills: 0 | Status: DISCONTINUED | COMMUNITY
Start: 2017-12-13 | End: 2018-11-13

## 2018-11-13 RX ORDER — ROPINIROLE 1 MG/1
1 TABLET, FILM COATED ORAL
Refills: 0 | Status: DISCONTINUED | COMMUNITY
End: 2018-11-13

## 2018-11-13 RX ORDER — ALBUTEROL SULFATE 90 UG/1
108 (90 BASE) AEROSOL, METERED RESPIRATORY (INHALATION)
Refills: 0 | Status: DISCONTINUED | COMMUNITY
End: 2018-11-13

## 2018-11-13 RX ORDER — CLINDAMYCIN HYDROCHLORIDE 150 MG/1
150 CAPSULE ORAL
Qty: 30 | Refills: 0 | Status: DISCONTINUED | COMMUNITY
Start: 2018-01-18 | End: 2018-11-13

## 2018-11-13 RX ORDER — OXYCODONE AND ACETAMINOPHEN 5; 325 MG/1; MG/1
5-325 TABLET ORAL
Qty: 20 | Refills: 0 | Status: DISCONTINUED | COMMUNITY
Start: 2017-12-05 | End: 2018-11-13

## 2018-11-13 RX ORDER — MORPHINE SULFATE 60 MG/1
60 CAPSULE, EXTENDED RELEASE ORAL
Refills: 0 | Status: DISCONTINUED | COMMUNITY
End: 2018-11-13

## 2018-12-18 ENCOUNTER — RESULT REVIEW (OUTPATIENT)
Age: 53
End: 2018-12-18

## 2019-01-08 ENCOUNTER — APPOINTMENT (OUTPATIENT)
Dept: ULTRASOUND IMAGING | Facility: CLINIC | Age: 54
End: 2019-01-08
Payer: MEDICARE

## 2019-01-08 ENCOUNTER — OUTPATIENT (OUTPATIENT)
Dept: OUTPATIENT SERVICES | Facility: HOSPITAL | Age: 54
LOS: 1 days | End: 2019-01-08
Payer: MEDICARE

## 2019-01-08 ENCOUNTER — APPOINTMENT (OUTPATIENT)
Dept: MAMMOGRAPHY | Facility: CLINIC | Age: 54
End: 2019-01-08
Payer: MEDICARE

## 2019-01-08 DIAGNOSIS — N80.0 ENDOMETRIOSIS OF UTERUS: Chronic | ICD-10-CM

## 2019-01-08 DIAGNOSIS — C44.92 SQUAMOUS CELL CARCINOMA OF SKIN, UNSPECIFIED: Chronic | ICD-10-CM

## 2019-01-08 DIAGNOSIS — Z00.8 ENCOUNTER FOR OTHER GENERAL EXAMINATION: ICD-10-CM

## 2019-01-08 DIAGNOSIS — M26.51 ABNORMAL JAW CLOSURE: Chronic | ICD-10-CM

## 2019-01-08 DIAGNOSIS — R29.898 OTHER SYMPTOMS AND SIGNS INVOLVING THE MUSCULOSKELETAL SYSTEM: Chronic | ICD-10-CM

## 2019-01-08 DIAGNOSIS — M94.20 CHONDROMALACIA, UNSPECIFIED SITE: Chronic | ICD-10-CM

## 2019-01-08 DIAGNOSIS — M77.9 ENTHESOPATHY, UNSPECIFIED: Chronic | ICD-10-CM

## 2019-01-08 PROCEDURE — 77067 SCR MAMMO BI INCL CAD: CPT | Mod: 26

## 2019-01-08 PROCEDURE — 77063 BREAST TOMOSYNTHESIS BI: CPT | Mod: 26

## 2019-01-08 PROCEDURE — 77063 BREAST TOMOSYNTHESIS BI: CPT

## 2019-01-08 PROCEDURE — 76641 ULTRASOUND BREAST COMPLETE: CPT

## 2019-01-08 PROCEDURE — 76641 ULTRASOUND BREAST COMPLETE: CPT | Mod: 26,50

## 2019-01-08 PROCEDURE — 77067 SCR MAMMO BI INCL CAD: CPT

## 2019-01-24 ENCOUNTER — APPOINTMENT (OUTPATIENT)
Dept: ORTHOPEDIC SURGERY | Facility: CLINIC | Age: 54
End: 2019-01-24

## 2019-03-09 ENCOUNTER — TRANSCRIPTION ENCOUNTER (OUTPATIENT)
Age: 54
End: 2019-03-09

## 2019-05-03 ENCOUNTER — OTHER (OUTPATIENT)
Age: 54
End: 2019-05-03

## 2019-06-03 ENCOUNTER — FORM ENCOUNTER (OUTPATIENT)
Age: 54
End: 2019-06-03

## 2019-06-03 VITALS — HEIGHT: 62 IN | BODY MASS INDEX: 18.77 KG/M2 | WEIGHT: 102 LBS

## 2019-06-03 DIAGNOSIS — Z12.2 ENCOUNTER FOR SCREENING FOR MALIGNANT NEOPLASM OF RESPIRATORY ORGANS: ICD-10-CM

## 2019-06-04 ENCOUNTER — APPOINTMENT (OUTPATIENT)
Dept: CT IMAGING | Facility: CLINIC | Age: 54
End: 2019-06-04
Payer: MEDICARE

## 2019-06-04 ENCOUNTER — OUTPATIENT (OUTPATIENT)
Dept: OUTPATIENT SERVICES | Facility: HOSPITAL | Age: 54
LOS: 1 days | End: 2019-06-04
Payer: MEDICARE

## 2019-06-04 DIAGNOSIS — R29.898 OTHER SYMPTOMS AND SIGNS INVOLVING THE MUSCULOSKELETAL SYSTEM: Chronic | ICD-10-CM

## 2019-06-04 DIAGNOSIS — C44.92 SQUAMOUS CELL CARCINOMA OF SKIN, UNSPECIFIED: Chronic | ICD-10-CM

## 2019-06-04 DIAGNOSIS — N80.0 ENDOMETRIOSIS OF UTERUS: Chronic | ICD-10-CM

## 2019-06-04 DIAGNOSIS — M94.20 CHONDROMALACIA, UNSPECIFIED SITE: Chronic | ICD-10-CM

## 2019-06-04 DIAGNOSIS — Z00.8 ENCOUNTER FOR OTHER GENERAL EXAMINATION: ICD-10-CM

## 2019-06-04 DIAGNOSIS — M77.9 ENTHESOPATHY, UNSPECIFIED: Chronic | ICD-10-CM

## 2019-06-04 DIAGNOSIS — M26.51 ABNORMAL JAW CLOSURE: Chronic | ICD-10-CM

## 2019-06-04 PROCEDURE — G0297: CPT | Mod: 26

## 2019-06-04 PROCEDURE — G0297: CPT

## 2019-07-08 ENCOUNTER — APPOINTMENT (OUTPATIENT)
Dept: PULMONOLOGY | Facility: CLINIC | Age: 54
End: 2019-07-08
Payer: MEDICARE

## 2019-07-08 VITALS
RESPIRATION RATE: 16 BRPM | OXYGEN SATURATION: 97 % | DIASTOLIC BLOOD PRESSURE: 62 MMHG | HEART RATE: 71 BPM | SYSTOLIC BLOOD PRESSURE: 102 MMHG

## 2019-07-08 PROCEDURE — 99214 OFFICE O/P EST MOD 30 MIN: CPT

## 2019-07-08 RX ORDER — CLONIDINE HYDROCHLORIDE 0.2 MG/1
0.2 TABLET ORAL
Refills: 0 | Status: DISCONTINUED | COMMUNITY
End: 2019-07-08

## 2019-07-08 RX ORDER — LINACLOTIDE 290 UG/1
290 CAPSULE, GELATIN COATED ORAL
Qty: 90 | Refills: 0 | Status: DISCONTINUED | COMMUNITY
Start: 2019-02-14

## 2019-07-08 RX ORDER — CLONIDINE HYDROCHLORIDE 0.3 MG/1
0.3 TABLET ORAL
Qty: 90 | Refills: 0 | Status: DISCONTINUED | COMMUNITY
Start: 2019-05-14

## 2019-07-08 NOTE — PHYSICAL EXAM
[Jugular Venous Distention Increased] : there was no jugular-venous distention [Heart Sounds] : normal S1 and S2 [Respiration, Rhythm And Depth] : normal respiratory rhythm and effort [Exaggerated Use Of Accessory Muscles For Inspiration] : no accessory muscle use [Auscultation Breath Sounds / Voice Sounds] : lungs were clear to auscultation bilaterally [Abdomen Soft] : soft [Abdomen Tenderness] : non-tender [] : no hepato-splenomegaly [Abdomen Mass (___ Cm)] : no abdominal mass palpated [Cyanosis, Localized] : no localized cyanosis [FreeTextEntry1] : ?? localized clubbing digits 1-3 left. Pulses 2 plus

## 2019-07-08 NOTE — ASSESSMENT
[FreeTextEntry1] : Continue CPAP present settings.\par PFT on rto\par Smoking cessation options and importance discussed with patient.\par CT 1 year\par

## 2019-07-08 NOTE — HISTORY OF PRESENT ILLNESS
[FreeTextEntry1] : S/p recent insertion of morphine pump for chronic back pain.\par \par Feeling OK\par Respiratory status stable\par Using CPAP\par \par No significant cough, wheezing, chest pain or SOB.\par \par Smoking 5 cigs/day on average

## 2019-07-08 NOTE — DISCUSSION/SUMMARY
[FreeTextEntry1] : COPD smoking. Clinically stable\par CT ok\par STEPHANI on CPAP doing well with this modality

## 2019-07-30 ENCOUNTER — EMERGENCY (EMERGENCY)
Facility: HOSPITAL | Age: 54
LOS: 1 days | Discharge: HOME | End: 2019-07-30
Attending: EMERGENCY MEDICINE
Payer: MEDICARE

## 2019-07-30 VITALS
SYSTOLIC BLOOD PRESSURE: 126 MMHG | TEMPERATURE: 99 F | HEIGHT: 61 IN | DIASTOLIC BLOOD PRESSURE: 76 MMHG | RESPIRATION RATE: 18 BRPM | OXYGEN SATURATION: 100 % | HEART RATE: 73 BPM | WEIGHT: 106.04 LBS

## 2019-07-30 VITALS
SYSTOLIC BLOOD PRESSURE: 196 MMHG | DIASTOLIC BLOOD PRESSURE: 98 MMHG | RESPIRATION RATE: 18 BRPM | TEMPERATURE: 98 F | HEART RATE: 68 BPM | OXYGEN SATURATION: 100 %

## 2019-07-30 DIAGNOSIS — N80.0 ENDOMETRIOSIS OF UTERUS: Chronic | ICD-10-CM

## 2019-07-30 DIAGNOSIS — M26.51 ABNORMAL JAW CLOSURE: Chronic | ICD-10-CM

## 2019-07-30 DIAGNOSIS — C44.92 SQUAMOUS CELL CARCINOMA OF SKIN, UNSPECIFIED: Chronic | ICD-10-CM

## 2019-07-30 DIAGNOSIS — R29.898 OTHER SYMPTOMS AND SIGNS INVOLVING THE MUSCULOSKELETAL SYSTEM: Chronic | ICD-10-CM

## 2019-07-30 DIAGNOSIS — M94.20 CHONDROMALACIA, UNSPECIFIED SITE: Chronic | ICD-10-CM

## 2019-07-30 DIAGNOSIS — M77.9 ENTHESOPATHY, UNSPECIFIED: Chronic | ICD-10-CM

## 2019-07-30 PROCEDURE — 99283 EMERGENCY DEPT VISIT LOW MDM: CPT | Mod: GC

## 2019-07-30 PROCEDURE — 99283 EMERGENCY DEPT VISIT LOW MDM: CPT

## 2019-07-30 RX ORDER — MORPHINE SULFATE 50 MG/1
1 CAPSULE, EXTENDED RELEASE ORAL
Qty: 6 | Refills: 0
Start: 2019-07-30 | End: 2019-08-01

## 2019-07-30 NOTE — ED PROVIDER NOTE - PROGRESS NOTE DETAILS
spoke with neurosurgery who report they only implant the devices, they have no role in the refills and the patient did not have her surgery done by their team. - resident Matthew Holland spoke with neurology and anesthesia and both report that they cannot refill the device. spoke with NP Bindu Cotton ([phone]) who also reports that she cannot refill the device. plan is to discharge the patient with previous prescription of PO morphine so she does not go into withdrawal or have pain and should got to Dr. Perry's office tomorrow to have the device filled with either saline or medication. patient and Rep agree to plan.- resident Matthew Holland with rep Thomas turned down the infusion rate on the pump from 0.6 to 0.006 so that the medication would last several more days before running out. - resident Matthew Holland

## 2019-07-30 NOTE — ED PROVIDER NOTE - CLINICAL SUMMARY MEDICAL DECISION MAKING FREE TEXT BOX
54F presenting for med refill of intrathecal morphine pump. spoke with the patient's pain management doctor. unsure if/how to get pump refilled. will speak with neurosurgery, NP. will reassess.

## 2019-07-30 NOTE — ED PROVIDER NOTE - ATTENDING CONTRIBUTION TO CARE
attending Pollack: 54yF h/o PMR, CRPS, anxiety, asthma, fibromyalgia presenting for refill of morphine pump. Will reach out to consulting services (NSG, neuro, anesthesia) to inquire about help with pump refill, reassess

## 2019-07-30 NOTE — ED PROVIDER NOTE - PHYSICAL EXAMINATION
general: well appearing female, no acute distress   heent: normocephalic, atraumatic   respiratory: normal work of breathing, lungs clear to auscultation bilaterally   cardiac: regular rate and rhythm   abdomen: soft, non-tender, device palpated in RLQ  skin: no rashes   neuro: A&Ox3

## 2019-07-30 NOTE — ED PROVIDER NOTE - NSFOLLOWUPINSTRUCTIONS_ED_ALL_ED_FT
You were seen in the emergency department for a medication refill, unfortunately, no one in the hospital can refill the device.     Please see Dr. Perry or another pain specialist first thing in the morning so you can have your device refilled.     Please take the medication that was sent to your pharmacy to avoid pain and withdrawal symptoms.     If you have any worsening symptoms, severe abdominal pain, nausea, vomiting or you are unable to tolerate food or fluids please return to the emergency department.

## 2019-07-30 NOTE — ED ADULT TRIAGE NOTE - CHIEF COMPLAINT QUOTE
Pt has [employer]  intrathecal Morphine  pump for chronic pain management .  Pt ran out of refills.

## 2019-07-30 NOTE — ED ADULT NURSE NOTE - CHIEF COMPLAINT QUOTE
Pt has [employer]  intrathecal Morphine  pump for chronic pain management .  Pt ran out of refills.

## 2019-07-30 NOTE — ED ADULT NURSE NOTE - OBJECTIVE STATEMENT
54 female with pmh of polymyalgia rheumatic, complex regional pain syndrome, anxiety, asthma, fibromyalgia presenting to ED for medication refill. Pt states "I had a pain pump placed into my belly 30 days ago but because of insurance issues, I couldn't get the medication filled. It's almost empty and it will need to be replaced if I can't get the medication filled because the device will be damaged." Pt [employer] rep at bedside. Upon exam pt AOx3 breathing even unlabored spontaneously, NAD. Pt denies chest pain, sob, ha, n/v/d, abdominal pain, f/c, urinary symptoms, hematuria

## 2019-07-30 NOTE — ED PROVIDER NOTE - OBJECTIVE STATEMENT
54F, pmh of polymyalgia rheumatic, complex regional pain syndrome, anxiety, asthma, fibromyalgia presenting for medication refill. patient recently had implantable pain control device inserted into her abdomen approximately 30 days ago. patient has been unable to have the medication refilled due to insurance issues and is near empty. if the device is empty for a prolonged period of time it can be damaged and may need to be replaced. patient is here with [employer] rep.     Pain Management: Dr. Perry [phone]

## 2019-07-31 NOTE — ED POST DISCHARGE NOTE - ADDITIONAL DOCUMENTATION
7/31/19: Pt called, states that pain medication were sent as capsules and is requesting tablets. Spoke with [facility] pharmacist Valerie who states prescription was changed to tablets and her script is ready for . pt made aware. -Betzaida Ellison PA-C

## 2019-12-11 ENCOUNTER — APPOINTMENT (OUTPATIENT)
Dept: PULMONOLOGY | Facility: CLINIC | Age: 54
End: 2019-12-11

## 2020-03-09 ENCOUNTER — RESULT REVIEW (OUTPATIENT)
Age: 55
End: 2020-03-09

## 2020-04-03 ENCOUNTER — APPOINTMENT (OUTPATIENT)
Dept: PULMONOLOGY | Facility: CLINIC | Age: 55
End: 2020-04-03

## 2020-05-26 ENCOUNTER — APPOINTMENT (OUTPATIENT)
Dept: PULMONOLOGY | Facility: CLINIC | Age: 55
End: 2020-05-26
Payer: MEDICARE

## 2020-05-26 DIAGNOSIS — Z00.00 ENCOUNTER FOR GENERAL ADULT MEDICAL EXAMINATION W/OUT ABNORMAL FINDINGS: ICD-10-CM

## 2020-05-26 PROCEDURE — 99213 OFFICE O/P EST LOW 20 MIN: CPT | Mod: 95

## 2020-05-26 NOTE — HISTORY OF PRESENT ILLNESS
[Home] : at home, [unfilled] , at the time of the visit. [Medical Office: ([facility])___] : at the medical office located in  [TextBox_4] : This visit was provided via telehealth using real-time 2-way audio visual technology. The patient, MACO JAIN , was located at home, [address]\[place] [address]  at the time of the visit.\par The provider, Dimitri Yoon, was located at office [address] [place], [place] at the time of the visit. \par \par  The patient, Ms. MACO JAIN  and Physician Dimitri Swift participated in the telehealth encounter.\par \par Verbal consent obtained by  from patient\par \par Doing well\par Due for CT.\par Back OK with pump.\par Smoking very little. \par Doing well with CPAP\par \par

## 2020-05-26 NOTE — ASSESSMENT
[FreeTextEntry1] : Repeat screening CT of the chest.\par Patient to return for evaluation in office and lung function testing.\par Recommend discontinuing smoking\par \par Duration discussion and decision making 15 minutes.\par

## 2020-05-26 NOTE — DISCUSSION/SUMMARY
[FreeTextEntry1] : Current every day smoker.\par Mild underlying chronic obstructive pulmonary disease.\par

## 2020-10-22 ENCOUNTER — NON-APPOINTMENT (OUTPATIENT)
Age: 55
End: 2020-10-22

## 2020-10-22 VITALS — HEIGHT: 62 IN | WEIGHT: 102 LBS | BODY MASS INDEX: 18.77 KG/M2

## 2020-10-22 DIAGNOSIS — R91.1 SOLITARY PULMONARY NODULE: ICD-10-CM

## 2020-10-23 ENCOUNTER — APPOINTMENT (OUTPATIENT)
Dept: MAMMOGRAPHY | Facility: CLINIC | Age: 55
End: 2020-10-23
Payer: MEDICARE

## 2020-10-23 ENCOUNTER — OUTPATIENT (OUTPATIENT)
Dept: OUTPATIENT SERVICES | Facility: HOSPITAL | Age: 55
LOS: 1 days | End: 2020-10-23
Payer: MEDICARE

## 2020-10-23 ENCOUNTER — APPOINTMENT (OUTPATIENT)
Dept: CT IMAGING | Facility: CLINIC | Age: 55
End: 2020-10-23
Payer: MEDICARE

## 2020-10-23 ENCOUNTER — RESULT REVIEW (OUTPATIENT)
Age: 55
End: 2020-10-23

## 2020-10-23 ENCOUNTER — APPOINTMENT (OUTPATIENT)
Dept: THORACIC SURGERY | Facility: CLINIC | Age: 55
End: 2020-10-23
Payer: MEDICARE

## 2020-10-23 ENCOUNTER — APPOINTMENT (OUTPATIENT)
Dept: ULTRASOUND IMAGING | Facility: CLINIC | Age: 55
End: 2020-10-23
Payer: MEDICARE

## 2020-10-23 DIAGNOSIS — N80.0 ENDOMETRIOSIS OF UTERUS: Chronic | ICD-10-CM

## 2020-10-23 DIAGNOSIS — Z12.31 ENCOUNTER FOR SCREENING MAMMOGRAM FOR MALIGNANT NEOPLASM OF BREAST: ICD-10-CM

## 2020-10-23 DIAGNOSIS — M94.20 CHONDROMALACIA, UNSPECIFIED SITE: Chronic | ICD-10-CM

## 2020-10-23 DIAGNOSIS — R29.898 OTHER SYMPTOMS AND SIGNS INVOLVING THE MUSCULOSKELETAL SYSTEM: Chronic | ICD-10-CM

## 2020-10-23 DIAGNOSIS — R92.2 INCONCLUSIVE MAMMOGRAM: ICD-10-CM

## 2020-10-23 DIAGNOSIS — M77.9 ENTHESOPATHY, UNSPECIFIED: Chronic | ICD-10-CM

## 2020-10-23 DIAGNOSIS — M26.51 ABNORMAL JAW CLOSURE: Chronic | ICD-10-CM

## 2020-10-23 DIAGNOSIS — C44.92 SQUAMOUS CELL CARCINOMA OF SKIN, UNSPECIFIED: Chronic | ICD-10-CM

## 2020-10-23 DIAGNOSIS — G47.33 OBSTRUCTIVE SLEEP APNEA (ADULT) (PEDIATRIC): ICD-10-CM

## 2020-10-23 PROCEDURE — G0297: CPT | Mod: 26

## 2020-10-23 PROCEDURE — 77067 SCR MAMMO BI INCL CAD: CPT

## 2020-10-23 PROCEDURE — G0296 VISIT TO DETERM LDCT ELIG: CPT

## 2020-10-23 PROCEDURE — 77067 SCR MAMMO BI INCL CAD: CPT | Mod: 26

## 2020-10-23 PROCEDURE — 76641 ULTRASOUND BREAST COMPLETE: CPT | Mod: 26,50

## 2020-10-23 PROCEDURE — 77063 BREAST TOMOSYNTHESIS BI: CPT | Mod: 26

## 2020-10-23 PROCEDURE — 76641 ULTRASOUND BREAST COMPLETE: CPT

## 2020-10-23 PROCEDURE — 71271 CT THORAX LUNG CANCER SCR C-: CPT

## 2020-10-23 PROCEDURE — 77063 BREAST TOMOSYNTHESIS BI: CPT

## 2020-10-26 NOTE — PLAN
[FreeTextEntry1] : Her LDCT is scheduled for 10/23/20 at the [facility].  She will follow up with Dr. Yoon for the results.  (She has an appt with Dr. Yoon 11/6).

## 2020-10-26 NOTE — HISTORY OF PRESENT ILLNESS
[TextBox_13] : She denies hemoptysis, denies new cough, denies unexplained weight loss.\par She is a current and 32.5  pack year smoker (1ppd x 32 years then 0.5ppd x 1 year) She says she's continuing to cut down and is now smoking 3-4 ciggs/day. She has a family h/o lung cancer (maternal grandfather).  She is referred by Dr. Dimitri Yoon.  \par

## 2020-10-29 DIAGNOSIS — Z20.828 CONTACT WITH AND (SUSPECTED) EXPOSURE TO OTHER VIRAL COMMUNICABLE DISEASES: ICD-10-CM

## 2020-11-01 DIAGNOSIS — Z01.818 ENCOUNTER FOR OTHER PREPROCEDURAL EXAMINATION: ICD-10-CM

## 2020-11-03 ENCOUNTER — APPOINTMENT (OUTPATIENT)
Dept: DISASTER EMERGENCY | Facility: CLINIC | Age: 55
End: 2020-11-03

## 2020-11-04 LAB — SARS-COV-2 N GENE NPH QL NAA+PROBE: NOT DETECTED

## 2020-11-06 ENCOUNTER — APPOINTMENT (OUTPATIENT)
Dept: PULMONOLOGY | Facility: CLINIC | Age: 55
End: 2020-11-06
Payer: MEDICARE

## 2020-11-06 VITALS
OXYGEN SATURATION: 98 % | HEART RATE: 68 BPM | RESPIRATION RATE: 16 BRPM | DIASTOLIC BLOOD PRESSURE: 74 MMHG | SYSTOLIC BLOOD PRESSURE: 147 MMHG | BODY MASS INDEX: 19.88 KG/M2 | WEIGHT: 108 LBS | TEMPERATURE: 98.7 F | HEIGHT: 62 IN

## 2020-11-06 VITALS — SYSTOLIC BLOOD PRESSURE: 121 MMHG | DIASTOLIC BLOOD PRESSURE: 77 MMHG

## 2020-11-06 LAB — POCT - HEMOGLOBIN (HGB), QUANTITATIVE, TRANSCUTANEOUS: 13

## 2020-11-06 PROCEDURE — 94726 PLETHYSMOGRAPHY LUNG VOLUMES: CPT

## 2020-11-06 PROCEDURE — ZZZZZ: CPT

## 2020-11-06 PROCEDURE — 99214 OFFICE O/P EST MOD 30 MIN: CPT | Mod: 25

## 2020-11-06 PROCEDURE — 88738 HGB QUANT TRANSCUTANEOUS: CPT

## 2020-11-06 PROCEDURE — 94010 BREATHING CAPACITY TEST: CPT

## 2020-11-06 PROCEDURE — 94729 DIFFUSING CAPACITY: CPT

## 2020-11-06 PROCEDURE — 94750: CPT

## 2020-11-09 NOTE — PHYSICAL EXAM
[No Acute Distress] : no acute distress [Supple] : supple [No JVD] : no jvd [Normal S1, S2] : normal s1, s2 [Clear to Auscultation Bilaterally] : clear to auscultation bilaterally [Normal to Percussion] : normal to percussion [Benign] : benign [No Clubbing] : no clubbing [No Cyanosis] : no cyanosis [No Edema] : no edema

## 2020-11-10 NOTE — PROCEDURE
[FreeTextEntry1] : sleep study from [facility] 2016 severe brown\par \par cpap data downloaded and discussed with patient\par \par CT reviewed and renewed.  October 23, 2020.  Lung RADS 1\par \par 11/06/2020\par Pulmonary function testing\par FEV1, FVC, and FEV1/FVC are within normal limits. TLC is normal. FRC is increased. RV is normal. RV/TLC ratio is normal. Resistance and specific conductance are normal. There is a mild-mod diffusion impairment \par PFT attached relatively stable function.\par

## 2020-11-10 NOTE — DISCUSSION/SUMMARY
[FreeTextEntry1] : COPD smoking. Clinically and functionally stable.  Attempting to discontinue smoking.\par CT stable\par STEPHANI on CPAP doing well with this modality

## 2020-11-10 NOTE — HISTORY OF PRESENT ILLNESS
[Current] : current [>= 30 pack years] : >= 30 pack years [TextBox_4] : down to smoking 3 /day\par Got flu and pneumonia shot\par \par \par had Cpap with the sleep lopes (cloth)\par  machine greater than 5 years old\[facility]\par \par wants to get a [employer]\par \par \par has a intrathecal pump with hydromorphone and Buminate [TextBox_11] : 1 [TextBox_13] : 35

## 2020-11-24 ENCOUNTER — APPOINTMENT (OUTPATIENT)
Dept: PULMONOLOGY | Facility: CLINIC | Age: 55
End: 2020-11-24
Payer: MEDICARE

## 2020-11-24 PROCEDURE — 95800 SLP STDY UNATTENDED: CPT

## 2020-12-10 ENCOUNTER — APPOINTMENT (OUTPATIENT)
Dept: DISASTER EMERGENCY | Facility: CLINIC | Age: 55
End: 2020-12-10

## 2020-12-14 ENCOUNTER — APPOINTMENT (OUTPATIENT)
Dept: PULMONOLOGY | Facility: CLINIC | Age: 55
End: 2020-12-14
Payer: MEDICARE

## 2020-12-14 VITALS
HEIGHT: 62 IN | OXYGEN SATURATION: 99 % | TEMPERATURE: 98.4 F | BODY MASS INDEX: 20.61 KG/M2 | SYSTOLIC BLOOD PRESSURE: 140 MMHG | WEIGHT: 112 LBS | DIASTOLIC BLOOD PRESSURE: 77 MMHG | HEART RATE: 61 BPM

## 2020-12-14 PROCEDURE — 99213 OFFICE O/P EST LOW 20 MIN: CPT

## 2020-12-14 RX ORDER — PRAMIPEXOLE DIHYDROCHLORIDE 1 MG/1
1 TABLET ORAL
Qty: 360 | Refills: 0 | Status: ACTIVE | COMMUNITY
Start: 2020-11-05

## 2020-12-14 NOTE — ASSESSMENT
[FreeTextEntry1] : Continue CPAP present settings.\par Patient compliant to CPAP therapy and having positive clinical response to treatment.  will change settings on new machine because patient feels its not enough pressure\par \par \par \par CT 1 year Oct 2021\par \par \par r/t early march for compliance

## 2020-12-14 NOTE — HISTORY OF PRESENT ILLNESS
[Current] : current [>= 30 pack years] : >= 30 pack years [TextBox_4] : down to smoking 3 /day\par Got flu and pneumonia shot\par \par \par had Cpap with the sleep lopes (cloth)\par  machine greater than 5 years old, had recent 2 night HHS\par gets supples from [facility]\par \par \par \par \par has a intrathecal pump with hydromorphone and Buminate [TextBox_11] : 1 [TextBox_13] : 35

## 2020-12-14 NOTE — DISCUSSION/SUMMARY
[FreeTextEntry1] : COPD smoking. Clinically and functionally stable.  Attempting to discontinue smoking.\par CT stable\par STEPHANI on CPAP \par  will get auto cpap Resmed 8- 15 cm  with the same cloth mask, sleep lopes full face from [employer]\par \par

## 2020-12-14 NOTE — PROCEDURE
[FreeTextEntry1] : 2 night HHS discussed with pt , mild STEPHANI\par \par CT .  October 23, 2020.  Lung RADS 1\par \par 11/06/2020\par Pulmonary function testing\par FEV1, FVC, and FEV1/FVC are within normal limits. TLC is normal. FRC is increased. RV is normal. RV/TLC ratio is normal. Resistance and specific conductance are normal. There is a mild-mod diffusion impairment \par PFT attached relatively stable function.\par

## 2021-02-05 ENCOUNTER — APPOINTMENT (OUTPATIENT)
Dept: ULTRASOUND IMAGING | Facility: CLINIC | Age: 56
End: 2021-02-05
Payer: MEDICARE

## 2021-02-05 ENCOUNTER — OUTPATIENT (OUTPATIENT)
Dept: OUTPATIENT SERVICES | Facility: HOSPITAL | Age: 56
LOS: 1 days | End: 2021-02-05
Payer: MEDICARE

## 2021-02-05 DIAGNOSIS — M94.20 CHONDROMALACIA, UNSPECIFIED SITE: Chronic | ICD-10-CM

## 2021-02-05 DIAGNOSIS — R29.898 OTHER SYMPTOMS AND SIGNS INVOLVING THE MUSCULOSKELETAL SYSTEM: Chronic | ICD-10-CM

## 2021-02-05 DIAGNOSIS — Z00.8 ENCOUNTER FOR OTHER GENERAL EXAMINATION: ICD-10-CM

## 2021-02-05 DIAGNOSIS — M26.51 ABNORMAL JAW CLOSURE: Chronic | ICD-10-CM

## 2021-02-05 DIAGNOSIS — C44.92 SQUAMOUS CELL CARCINOMA OF SKIN, UNSPECIFIED: Chronic | ICD-10-CM

## 2021-02-05 DIAGNOSIS — N80.0 ENDOMETRIOSIS OF UTERUS: Chronic | ICD-10-CM

## 2021-02-05 DIAGNOSIS — M77.9 ENTHESOPATHY, UNSPECIFIED: Chronic | ICD-10-CM

## 2021-02-05 PROCEDURE — 76770 US EXAM ABDO BACK WALL COMP: CPT

## 2021-02-05 PROCEDURE — 76770 US EXAM ABDO BACK WALL COMP: CPT | Mod: 26

## 2021-03-01 ENCOUNTER — APPOINTMENT (OUTPATIENT)
Dept: PULMONOLOGY | Facility: CLINIC | Age: 56
End: 2021-03-01
Payer: MEDICARE

## 2021-03-01 VITALS
HEART RATE: 77 BPM | DIASTOLIC BLOOD PRESSURE: 73 MMHG | WEIGHT: 112 LBS | HEIGHT: 62 IN | SYSTOLIC BLOOD PRESSURE: 122 MMHG | TEMPERATURE: 98.3 F | BODY MASS INDEX: 20.61 KG/M2 | OXYGEN SATURATION: 100 %

## 2021-03-01 PROCEDURE — 99213 OFFICE O/P EST LOW 20 MIN: CPT

## 2021-03-01 NOTE — PROCEDURE
[FreeTextEntry1] : CPAP data reviewed and discussed.  Compliance good.\par \par CT .  October 23, 2020.  Lung RADS 1\par \par 11/06/2020\par Pulmonary function testing\par FEV1, FVC, and FEV1/FVC are within normal limits. TLC is normal. FRC is increased. RV is normal. RV/TLC ratio is normal. Resistance and specific conductance are normal. There is a mild-mod diffusion impairment \par PFT attached relatively stable function.\par

## 2021-03-01 NOTE — DISCUSSION/SUMMARY
[FreeTextEntry1] : COPD smoking. Clinically and functionally stable.  Attempting to discontinue smoking.\par CT stable\par STEPHANI on CPAP doing well.\par \par \par

## 2021-03-01 NOTE — HISTORY OF PRESENT ILLNESS
[Current] : current [>= 30 pack years] : >= 30 pack years [TextBox_4] : down to smoking 3 /day\par Got flu and pneumonia shot\par \par had Cpap with the sleep lopes (cloth)\par Doing well with new machine\par \par has a intrathecal pump with hydromorphone and Buminate [TextBox_11] : 1 [TextBox_13] : 35

## 2021-03-01 NOTE — ASSESSMENT
[FreeTextEntry1] : Continue CPAP present settings.\par Patient compliant to CPAP therapy and having positive clinical response to treatment.  will change settings on new machine because patient feels its not enough pressure\par Follow-up in 6 months.\par Discontinue smoking.\par \par \par CT 1 year Oct 2021\par \par \par

## 2021-08-19 NOTE — ED PROVIDER NOTE - NS ED MD EM SELECTION
Detail Level: Simple Detail Level: Generalized Detail Level: Zone 09434 Exp Problem Focused - Mod. Complex

## 2021-09-21 ENCOUNTER — OUTPATIENT (OUTPATIENT)
Dept: OUTPATIENT SERVICES | Facility: HOSPITAL | Age: 56
LOS: 1 days | End: 2021-09-21
Payer: MEDICARE

## 2021-09-21 ENCOUNTER — APPOINTMENT (OUTPATIENT)
Dept: MRI IMAGING | Facility: CLINIC | Age: 56
End: 2021-09-21
Payer: MEDICARE

## 2021-09-21 DIAGNOSIS — C44.92 SQUAMOUS CELL CARCINOMA OF SKIN, UNSPECIFIED: Chronic | ICD-10-CM

## 2021-09-21 DIAGNOSIS — R29.898 OTHER SYMPTOMS AND SIGNS INVOLVING THE MUSCULOSKELETAL SYSTEM: Chronic | ICD-10-CM

## 2021-09-21 DIAGNOSIS — M26.51 ABNORMAL JAW CLOSURE: Chronic | ICD-10-CM

## 2021-09-21 DIAGNOSIS — M94.20 CHONDROMALACIA, UNSPECIFIED SITE: Chronic | ICD-10-CM

## 2021-09-21 DIAGNOSIS — N80.0 ENDOMETRIOSIS OF UTERUS: Chronic | ICD-10-CM

## 2021-09-21 DIAGNOSIS — M77.9 ENTHESOPATHY, UNSPECIFIED: Chronic | ICD-10-CM

## 2021-09-21 DIAGNOSIS — Z00.8 ENCOUNTER FOR OTHER GENERAL EXAMINATION: ICD-10-CM

## 2021-09-21 PROCEDURE — 73221 MRI JOINT UPR EXTREM W/O DYE: CPT | Mod: MH

## 2021-09-21 PROCEDURE — 73221 MRI JOINT UPR EXTREM W/O DYE: CPT | Mod: 26,LT,MH

## 2021-10-19 DIAGNOSIS — Z01.812 ENCOUNTER FOR PREPROCEDURAL LABORATORY EXAMINATION: ICD-10-CM

## 2021-10-21 ENCOUNTER — APPOINTMENT (OUTPATIENT)
Dept: DISASTER EMERGENCY | Facility: CLINIC | Age: 56
End: 2021-10-21

## 2021-10-22 ENCOUNTER — APPOINTMENT (OUTPATIENT)
Dept: DISASTER EMERGENCY | Facility: CLINIC | Age: 56
End: 2021-10-22

## 2021-10-22 LAB — SARS-COV-2 N GENE NPH QL NAA+PROBE: NOT DETECTED

## 2021-10-26 ENCOUNTER — APPOINTMENT (OUTPATIENT)
Dept: PULMONOLOGY | Facility: CLINIC | Age: 56
End: 2021-10-26
Payer: MEDICARE

## 2021-10-26 VITALS — OXYGEN SATURATION: 99 % | DIASTOLIC BLOOD PRESSURE: 85 MMHG | SYSTOLIC BLOOD PRESSURE: 149 MMHG | HEART RATE: 84 BPM

## 2021-10-26 LAB — POCT - HEMOGLOBIN (HGB), QUANTITATIVE, TRANSCUTANEOUS: 10.2

## 2021-10-26 PROCEDURE — G0296 VISIT TO DETERM LDCT ELIG: CPT

## 2021-10-26 PROCEDURE — 94727 GAS DIL/WSHOT DETER LNG VOL: CPT

## 2021-10-26 PROCEDURE — 94010 BREATHING CAPACITY TEST: CPT

## 2021-10-26 PROCEDURE — 99214 OFFICE O/P EST MOD 30 MIN: CPT | Mod: 25

## 2021-10-26 PROCEDURE — 94729 DIFFUSING CAPACITY: CPT

## 2021-10-26 PROCEDURE — ZZZZZ: CPT

## 2021-10-26 PROCEDURE — 88738 HGB QUANT TRANSCUTANEOUS: CPT

## 2021-10-26 NOTE — HISTORY OF PRESENT ILLNESS
[Current] : current [>= 30 pack years] : >= 30 pack years [TextBox_4] : much less smoking ,JUUL at times having some allergies this past Fall\par no sob\par walkings 4 miles a day\par Got flu and pneumonia shot\par \par had Cpap with the sleep lopes (cloth)\par Doing well with new machine\par \par has a intrathecal pump with hydromorphone and Buminate\par \par Decreasing smoking to 4/day.\par \par  [TextBox_11] : 1 [TextBox_13] : 35

## 2021-10-26 NOTE — DISCUSSION/SUMMARY
[FreeTextEntry1] : COPD smoking.  A decrement in diffusion capacity may be related to COPD or simply technical.\par Candidate for continued screening CT. discussed.\par STEPHANI on CPAP doing well.\par \par \par

## 2021-10-26 NOTE — ASSESSMENT
[FreeTextEntry1] : Continue CPAP present settings.\par Patient compliant to CPAP therapy and having positive clinical response to treatment.  will change settings on new machine because patient feels its not enough pressure\par Follow-up in 6 months.\par Discontinue smoking.\par \par \par CT screening ordered\par \par \par

## 2021-10-26 NOTE — PROCEDURE
[FreeTextEntry1] : CPAP data reviewed and discussed.  Compliance good.\par \par CT .  October 23, 2020.  Lung RADS 1\par \par 10/26/2021\par Pulmonary function testing\par Normal Flow Rates There is evidence of mild hyperinflation. There is a moderate diffusion impairment.\par Compared to prior study of November 2020 there is no significant change in flow rates.  There is a mild decrement in diffusion capacity.\par

## 2021-11-07 ENCOUNTER — APPOINTMENT (OUTPATIENT)
Dept: THORACIC SURGERY | Facility: CLINIC | Age: 56
End: 2021-11-07
Payer: MEDICARE

## 2021-11-07 ENCOUNTER — NON-APPOINTMENT (OUTPATIENT)
Age: 56
End: 2021-11-07

## 2021-11-07 VITALS — BODY MASS INDEX: 18.77 KG/M2 | HEIGHT: 62 IN | WEIGHT: 102 LBS

## 2021-11-07 PROCEDURE — G0296 VISIT TO DETERM LDCT ELIG: CPT

## 2021-11-08 ENCOUNTER — OUTPATIENT (OUTPATIENT)
Dept: OUTPATIENT SERVICES | Facility: HOSPITAL | Age: 56
LOS: 1 days | End: 2021-11-08
Payer: MEDICARE

## 2021-11-08 ENCOUNTER — APPOINTMENT (OUTPATIENT)
Dept: CT IMAGING | Facility: CLINIC | Age: 56
End: 2021-11-08
Payer: MEDICARE

## 2021-11-08 DIAGNOSIS — N80.0 ENDOMETRIOSIS OF UTERUS: Chronic | ICD-10-CM

## 2021-11-08 DIAGNOSIS — C44.92 SQUAMOUS CELL CARCINOMA OF SKIN, UNSPECIFIED: Chronic | ICD-10-CM

## 2021-11-08 DIAGNOSIS — R29.898 OTHER SYMPTOMS AND SIGNS INVOLVING THE MUSCULOSKELETAL SYSTEM: Chronic | ICD-10-CM

## 2021-11-08 DIAGNOSIS — M77.9 ENTHESOPATHY, UNSPECIFIED: Chronic | ICD-10-CM

## 2021-11-08 DIAGNOSIS — M26.51 ABNORMAL JAW CLOSURE: Chronic | ICD-10-CM

## 2021-11-08 DIAGNOSIS — F17.200 NICOTINE DEPENDENCE, UNSPECIFIED, UNCOMPLICATED: ICD-10-CM

## 2021-11-08 DIAGNOSIS — M94.20 CHONDROMALACIA, UNSPECIFIED SITE: Chronic | ICD-10-CM

## 2021-11-08 PROCEDURE — 71271 CT THORAX LUNG CANCER SCR C-: CPT

## 2021-11-08 PROCEDURE — 71271 CT THORAX LUNG CANCER SCR C-: CPT | Mod: 26

## 2021-11-13 NOTE — HISTORY OF PRESENT ILLNESS
[TextBox_13] : Chart reviewed eligibility confirmed\par Annual Screen\par Current smoker\par 33 pack years (1PPD x 32 years, 0.5PPD x 2 years).\par Referred by Dr. Dimitri Yoon who documented the shared decision making. \par

## 2021-11-29 RX ORDER — COLCHICINE 0.6 MG/1
0.6 TABLET ORAL
Refills: 0 | Status: DISCONTINUED | COMMUNITY
End: 2021-11-29

## 2021-11-29 RX ORDER — CLONIDINE HYDROCHLORIDE 0.3 MG/1
0.3 TABLET ORAL
Refills: 0 | Status: DISCONTINUED | COMMUNITY
End: 2021-11-29

## 2021-12-05 NOTE — ED ADULT NURSE NOTE - PRIMARY CARE PROVIDER
Refill Request  Medication name: Pending Prescriptions:                       Disp   Refills    furosemide (LASIX) 20 MG tablet           90 tab*2            Sig: Take 1 tablet (20 mg) by mouth daily    Who prescribed the medication: Ivania  Last refill on medication: 4/19/21  Requested Pharmacy: [facility]  Last appointment with PCP: 9/20/21  Next appointment: Appointment scheduled for 4/4/22         Dr. Stoddard

## 2021-12-09 ENCOUNTER — RESULT REVIEW (OUTPATIENT)
Age: 56
End: 2021-12-09

## 2022-01-14 ENCOUNTER — APPOINTMENT (OUTPATIENT)
Dept: ULTRASOUND IMAGING | Facility: CLINIC | Age: 57
End: 2022-01-14
Payer: MEDICARE

## 2022-01-14 ENCOUNTER — OUTPATIENT (OUTPATIENT)
Dept: OUTPATIENT SERVICES | Facility: HOSPITAL | Age: 57
LOS: 1 days | End: 2022-01-14
Payer: MEDICARE

## 2022-01-14 ENCOUNTER — APPOINTMENT (OUTPATIENT)
Dept: MAMMOGRAPHY | Facility: CLINIC | Age: 57
End: 2022-01-14
Payer: MEDICARE

## 2022-01-14 DIAGNOSIS — M26.51 ABNORMAL JAW CLOSURE: Chronic | ICD-10-CM

## 2022-01-14 DIAGNOSIS — C44.92 SQUAMOUS CELL CARCINOMA OF SKIN, UNSPECIFIED: Chronic | ICD-10-CM

## 2022-01-14 DIAGNOSIS — M77.9 ENTHESOPATHY, UNSPECIFIED: Chronic | ICD-10-CM

## 2022-01-14 DIAGNOSIS — Z00.00 ENCOUNTER FOR GENERAL ADULT MEDICAL EXAMINATION WITHOUT ABNORMAL FINDINGS: ICD-10-CM

## 2022-01-14 DIAGNOSIS — R29.898 OTHER SYMPTOMS AND SIGNS INVOLVING THE MUSCULOSKELETAL SYSTEM: Chronic | ICD-10-CM

## 2022-01-14 DIAGNOSIS — M94.20 CHONDROMALACIA, UNSPECIFIED SITE: Chronic | ICD-10-CM

## 2022-01-14 DIAGNOSIS — N80.0 ENDOMETRIOSIS OF UTERUS: Chronic | ICD-10-CM

## 2022-01-14 PROCEDURE — 77067 SCR MAMMO BI INCL CAD: CPT | Mod: 26

## 2022-01-14 PROCEDURE — 77063 BREAST TOMOSYNTHESIS BI: CPT | Mod: 26

## 2022-01-14 PROCEDURE — 76641 ULTRASOUND BREAST COMPLETE: CPT | Mod: 26,50

## 2022-01-14 PROCEDURE — 76641 ULTRASOUND BREAST COMPLETE: CPT

## 2022-01-14 PROCEDURE — 77067 SCR MAMMO BI INCL CAD: CPT

## 2022-01-14 PROCEDURE — 77063 BREAST TOMOSYNTHESIS BI: CPT

## 2022-01-18 ENCOUNTER — APPOINTMENT (OUTPATIENT)
Dept: RHEUMATOLOGY | Facility: CLINIC | Age: 57
End: 2022-01-18

## 2022-04-12 ENCOUNTER — NON-APPOINTMENT (OUTPATIENT)
Age: 57
End: 2022-04-12

## 2022-04-19 ENCOUNTER — APPOINTMENT (OUTPATIENT)
Dept: PULMONOLOGY | Facility: CLINIC | Age: 57
End: 2022-04-19
Payer: MEDICARE

## 2022-04-19 VITALS
WEIGHT: 128 LBS | BODY MASS INDEX: 23.41 KG/M2 | SYSTOLIC BLOOD PRESSURE: 133 MMHG | DIASTOLIC BLOOD PRESSURE: 84 MMHG | OXYGEN SATURATION: 98 % | HEART RATE: 81 BPM

## 2022-04-19 DIAGNOSIS — D64.9 ANEMIA, UNSPECIFIED: ICD-10-CM

## 2022-04-19 LAB — POCT - HEMOGLOBIN (HGB), QUANTITATIVE, TRANSCUTANEOUS: 10

## 2022-04-19 PROCEDURE — 88738 HGB QUANT TRANSCUTANEOUS: CPT

## 2022-04-19 PROCEDURE — 94010 BREATHING CAPACITY TEST: CPT

## 2022-04-19 PROCEDURE — 94729 DIFFUSING CAPACITY: CPT

## 2022-04-19 PROCEDURE — ZZZZZ: CPT

## 2022-04-19 PROCEDURE — 99213 OFFICE O/P EST LOW 20 MIN: CPT | Mod: 25

## 2022-04-19 PROCEDURE — 94727 GAS DIL/WSHOT DETER LNG VOL: CPT

## 2022-04-19 NOTE — HISTORY OF PRESENT ILLNESS
[Current] : current [TextBox_4] : only smokes 3 cigarettes a day, using Nicorette lozenges and JUUL\par no sob\[place]\par Got flu and pneumonia shot\par \par had Cpap with the sleep lopes (cloth)\par Doing well with new machine \par \par has a intrathecal pump with hydromorphone and Buminate. Pain controlled. \par \par \par \par  [TextBox_11] : 1 [TextBox_13] : 35

## 2022-04-19 NOTE — PROCEDURE
[FreeTextEntry1] : CPAP data reviewed and discussed.  Compliance good.\par \par 04/19/2022\par Pulmonary function testing\par Normal Flow Rates There is evidence of mild hyperinflation. There is a mild diffusion impairment. \par Compared to prior study of October 2021 there is a mild decrement in FEV1 and mild increase in diffusion capacity.\par \par CT screening .Nov ,8 2021 reviewed\par

## 2022-04-19 NOTE — ASSESSMENT
[FreeTextEntry1] : Continue CPAP present settings.\par Follow-up in 6 months.\par Discontinue smoking.\par \par \par CT screening 1 year from last\par \par \par

## 2022-04-19 NOTE — DISCUSSION/SUMMARY
[FreeTextEntry1] : COPD with decrease in smoking.  Function relatively stable.\par Candidate for continued screening CT. discussed.\par STEPHANI on CPAP doing well.\par \par \par

## 2022-04-20 LAB
BASOPHILS # BLD AUTO: 0.01 K/UL
BASOPHILS NFR BLD AUTO: 0.2 %
EOSINOPHIL # BLD AUTO: 0.24 K/UL
EOSINOPHIL NFR BLD AUTO: 4.3 %
FERRITIN SERPL-MCNC: 26 NG/ML
FOLATE SERPL-MCNC: 15.6 NG/ML
HCT VFR BLD CALC: 38 %
HGB BLD-MCNC: 11.9 G/DL
IMM GRANULOCYTES NFR BLD AUTO: 0.2 %
IRON SATN MFR SERPL: 21 %
IRON SERPL-MCNC: 83 UG/DL
LYMPHOCYTES # BLD AUTO: 1.66 K/UL
LYMPHOCYTES NFR BLD AUTO: 29.4 %
MAN DIFF?: NORMAL
MCHC RBC-ENTMCNC: 30.7 PG
MCHC RBC-ENTMCNC: 31.3 GM/DL
MCV RBC AUTO: 98.2 FL
MONOCYTES # BLD AUTO: 0.28 K/UL
MONOCYTES NFR BLD AUTO: 5 %
NEUTROPHILS # BLD AUTO: 3.44 K/UL
NEUTROPHILS NFR BLD AUTO: 60.9 %
PLATELET # BLD AUTO: 220 K/UL
RBC # BLD: 3.87 M/UL
RBC # FLD: 13.5 %
TIBC SERPL-MCNC: 401 UG/DL
UIBC SERPL-MCNC: 318 UG/DL
VIT B12 SERPL-MCNC: 460 PG/ML
WBC # FLD AUTO: 5.64 K/UL

## 2022-07-11 NOTE — ED ADULT NURSE NOTE - WEIGHT IN LBS
115.9 Staged Advancement Flap Text: The defect edges were debeveled with a #15 scalpel blade.  Given the location of the defect, shape of the defect and the proximity to free margins a staged advancement flap was deemed most appropriate.  Using a sterile surgical marker, an appropriate advancement flap was drawn incorporating the defect and placing the expected incisions within the relaxed skin tension lines where possible. The area thus outlined was incised deep to adipose tissue with a #15 scalpel blade.  The skin margins were undermined to an appropriate distance in all directions utilizing iris scissors.

## 2022-11-10 ENCOUNTER — NON-APPOINTMENT (OUTPATIENT)
Age: 57
End: 2022-11-10

## 2022-11-10 VITALS — BODY MASS INDEX: 23.55 KG/M2 | WEIGHT: 128 LBS | HEIGHT: 62 IN

## 2022-11-10 NOTE — HISTORY OF PRESENT ILLNESS
[Current] : Current [TextBox_13] : Patient is scheduled for a annual LDCT for lung cancer screening. Shared decision making managed by Dr. Yoon. Chart review performed to confirm eligibility for LDCT. \par \par  No documented personal history of lung cancer. No documented s/s of lung cancer. Patient is a current smoker with a 33 pack year history ( [id number]), ( .5x3)\par  [PacksperYear] : 33

## 2022-11-15 ENCOUNTER — APPOINTMENT (OUTPATIENT)
Dept: CT IMAGING | Facility: CLINIC | Age: 57
End: 2022-11-15

## 2022-11-15 ENCOUNTER — OUTPATIENT (OUTPATIENT)
Dept: OUTPATIENT SERVICES | Facility: HOSPITAL | Age: 57
LOS: 1 days | End: 2022-11-15
Payer: MEDICARE

## 2022-11-15 DIAGNOSIS — R29.898 OTHER SYMPTOMS AND SIGNS INVOLVING THE MUSCULOSKELETAL SYSTEM: Chronic | ICD-10-CM

## 2022-11-15 DIAGNOSIS — M94.20 CHONDROMALACIA, UNSPECIFIED SITE: Chronic | ICD-10-CM

## 2022-11-15 DIAGNOSIS — M77.9 ENTHESOPATHY, UNSPECIFIED: Chronic | ICD-10-CM

## 2022-11-15 DIAGNOSIS — C44.92 SQUAMOUS CELL CARCINOMA OF SKIN, UNSPECIFIED: Chronic | ICD-10-CM

## 2022-11-15 DIAGNOSIS — M26.51 ABNORMAL JAW CLOSURE: Chronic | ICD-10-CM

## 2022-11-15 DIAGNOSIS — N80.0 ENDOMETRIOSIS OF UTERUS: Chronic | ICD-10-CM

## 2022-11-15 DIAGNOSIS — F17.200 NICOTINE DEPENDENCE, UNSPECIFIED, UNCOMPLICATED: ICD-10-CM

## 2022-11-15 PROCEDURE — 71271 CT THORAX LUNG CANCER SCR C-: CPT | Mod: 26

## 2022-11-15 PROCEDURE — 71271 CT THORAX LUNG CANCER SCR C-: CPT

## 2022-11-28 ENCOUNTER — APPOINTMENT (OUTPATIENT)
Dept: PULMONOLOGY | Facility: CLINIC | Age: 57
End: 2022-11-28

## 2022-11-28 VITALS
RESPIRATION RATE: 16 BRPM | DIASTOLIC BLOOD PRESSURE: 77 MMHG | HEART RATE: 67 BPM | SYSTOLIC BLOOD PRESSURE: 148 MMHG | OXYGEN SATURATION: 96 %

## 2022-11-28 DIAGNOSIS — I25.10 ATHEROSCLEROTIC HEART DISEASE OF NATIVE CORONARY ARTERY W/OUT ANGINA PECTORIS: ICD-10-CM

## 2022-11-28 PROCEDURE — 99214 OFFICE O/P EST MOD 30 MIN: CPT

## 2022-11-28 NOTE — DISCUSSION/SUMMARY
[FreeTextEntry1] : COPD with decrease in smoking.  Clinically stable.\par Coronary calcifications\par STEPHANI on CPAP doing well.\par \par \par

## 2022-11-28 NOTE — HISTORY OF PRESENT ILLNESS
[Current] : current [TextBox_4] : only smokes 2 cigarettes a day, using Nicorette lozenges and MYLE low dose\par no sob\[place]\par Got flu and pneumonia shot\par had Cpap with the sleep lopes (cloth)\par Doing well with new machine \par \par has a intrathecal pump with hydromorphone and Buminate. Pain controlled. \par \par \par \par  [TextBox_11] : 1 [TextBox_13] : 35

## 2022-11-28 NOTE — ASSESSMENT
[FreeTextEntry1] : Continue CPAP present settings.\par Follow-up in 6 months. Check PFT. \par Discontinue smoking.\par Consider cardiology evaluation.  To discuss with primary care.\par \par CT screening 1 year from last\par \par \par 35 minutes spent in evaluation management and review of studies.\par \par

## 2022-11-28 NOTE — PROCEDURE
[FreeTextEntry1] : CPAP data reviewed and discussed.  Compliance good.\par \par CAT scan of November 15, 2022 reviewed and discussed with patient.  Lung RADS 1.\par Does have coronary calcifications.\par \par \par Reviewed , will fax to PCP DR Stoddard re coronary calcifications\par \par 04/19/2022\par Pulmonary function testing\par Normal Flow Rates There is evidence of mild hyperinflation. There is a mild diffusion impairment. \par Compared to prior study of October 2021 there is a mild decrement in FEV1 and mild increase in diffusion capacity.\par \par CT screening .Nov ,8 2021 reviewed\par

## 2023-01-14 ENCOUNTER — APPOINTMENT (OUTPATIENT)
Dept: ULTRASOUND IMAGING | Facility: CLINIC | Age: 58
End: 2023-01-14
Payer: MEDICARE

## 2023-01-14 ENCOUNTER — OUTPATIENT (OUTPATIENT)
Dept: OUTPATIENT SERVICES | Facility: HOSPITAL | Age: 58
LOS: 1 days | End: 2023-01-14
Payer: MEDICARE

## 2023-01-14 ENCOUNTER — APPOINTMENT (OUTPATIENT)
Dept: MAMMOGRAPHY | Facility: CLINIC | Age: 58
End: 2023-01-14
Payer: MEDICARE

## 2023-01-14 DIAGNOSIS — R29.898 OTHER SYMPTOMS AND SIGNS INVOLVING THE MUSCULOSKELETAL SYSTEM: Chronic | ICD-10-CM

## 2023-01-14 DIAGNOSIS — N80.0 ENDOMETRIOSIS OF UTERUS: Chronic | ICD-10-CM

## 2023-01-14 DIAGNOSIS — M94.20 CHONDROMALACIA, UNSPECIFIED SITE: Chronic | ICD-10-CM

## 2023-01-14 DIAGNOSIS — M77.9 ENTHESOPATHY, UNSPECIFIED: Chronic | ICD-10-CM

## 2023-01-14 DIAGNOSIS — C44.92 SQUAMOUS CELL CARCINOMA OF SKIN, UNSPECIFIED: Chronic | ICD-10-CM

## 2023-01-14 DIAGNOSIS — M26.51 ABNORMAL JAW CLOSURE: Chronic | ICD-10-CM

## 2023-01-14 DIAGNOSIS — Z00.8 ENCOUNTER FOR OTHER GENERAL EXAMINATION: ICD-10-CM

## 2023-01-14 PROCEDURE — 76641 ULTRASOUND BREAST COMPLETE: CPT | Mod: 26,50

## 2023-01-14 PROCEDURE — 77063 BREAST TOMOSYNTHESIS BI: CPT | Mod: 26

## 2023-01-14 PROCEDURE — 76641 ULTRASOUND BREAST COMPLETE: CPT

## 2023-01-14 PROCEDURE — 77067 SCR MAMMO BI INCL CAD: CPT | Mod: 26

## 2023-01-14 PROCEDURE — 77063 BREAST TOMOSYNTHESIS BI: CPT

## 2023-01-14 PROCEDURE — 77067 SCR MAMMO BI INCL CAD: CPT

## 2023-03-06 ENCOUNTER — APPOINTMENT (OUTPATIENT)
Dept: MRI IMAGING | Facility: CLINIC | Age: 58
End: 2023-03-06
Payer: MEDICARE

## 2023-03-06 ENCOUNTER — OUTPATIENT (OUTPATIENT)
Dept: OUTPATIENT SERVICES | Facility: HOSPITAL | Age: 58
LOS: 1 days | End: 2023-03-06
Payer: MEDICARE

## 2023-03-06 DIAGNOSIS — C44.92 SQUAMOUS CELL CARCINOMA OF SKIN, UNSPECIFIED: Chronic | ICD-10-CM

## 2023-03-06 DIAGNOSIS — M77.9 ENTHESOPATHY, UNSPECIFIED: Chronic | ICD-10-CM

## 2023-03-06 DIAGNOSIS — Z00.00 ENCOUNTER FOR GENERAL ADULT MEDICAL EXAMINATION WITHOUT ABNORMAL FINDINGS: ICD-10-CM

## 2023-03-06 DIAGNOSIS — R29.898 OTHER SYMPTOMS AND SIGNS INVOLVING THE MUSCULOSKELETAL SYSTEM: Chronic | ICD-10-CM

## 2023-03-06 DIAGNOSIS — N80.0 ENDOMETRIOSIS OF UTERUS: Chronic | ICD-10-CM

## 2023-03-06 DIAGNOSIS — M94.20 CHONDROMALACIA, UNSPECIFIED SITE: Chronic | ICD-10-CM

## 2023-03-06 DIAGNOSIS — M26.51 ABNORMAL JAW CLOSURE: Chronic | ICD-10-CM

## 2023-03-06 PROCEDURE — 72141 MRI NECK SPINE W/O DYE: CPT | Mod: MH

## 2023-03-06 PROCEDURE — 72141 MRI NECK SPINE W/O DYE: CPT | Mod: 26,MH

## 2023-04-14 ENCOUNTER — NON-APPOINTMENT (OUTPATIENT)
Age: 58
End: 2023-04-14

## 2023-06-06 ENCOUNTER — LABORATORY RESULT (OUTPATIENT)
Age: 58
End: 2023-06-06

## 2023-06-06 ENCOUNTER — APPOINTMENT (OUTPATIENT)
Dept: RHEUMATOLOGY | Facility: CLINIC | Age: 58
End: 2023-06-06
Payer: MEDICARE

## 2023-06-06 VITALS
SYSTOLIC BLOOD PRESSURE: 124 MMHG | BODY MASS INDEX: 23 KG/M2 | TEMPERATURE: 97.6 F | DIASTOLIC BLOOD PRESSURE: 78 MMHG | WEIGHT: 125 LBS | HEART RATE: 60 BPM | OXYGEN SATURATION: 89 % | HEIGHT: 62 IN

## 2023-06-06 DIAGNOSIS — D68.61 ANTIPHOSPHOLIPID SYNDROME: ICD-10-CM

## 2023-06-06 DIAGNOSIS — M79.7 FIBROMYALGIA: ICD-10-CM

## 2023-06-06 PROCEDURE — 99205 OFFICE O/P NEW HI 60 MIN: CPT

## 2023-06-06 RX ORDER — CYCLOBENZAPRINE HCL 5 MG
5 TABLET ORAL
Refills: 0 | Status: DISCONTINUED | COMMUNITY
End: 2023-06-06

## 2023-06-07 LAB
25(OH)D3 SERPL-MCNC: 55.6 NG/ML
ALBUMIN SERPL ELPH-MCNC: 5 G/DL
ALP BLD-CCNC: 108 U/L
ALT SERPL-CCNC: 22 U/L
ANION GAP SERPL CALC-SCNC: 12 MMOL/L
APPEARANCE: CLEAR
AST SERPL-CCNC: 25 U/L
BILIRUB SERPL-MCNC: 0.6 MG/DL
BILIRUBIN URINE: NEGATIVE
BLOOD URINE: NEGATIVE
BUN SERPL-MCNC: 17 MG/DL
C3 SERPL-MCNC: 133 MG/DL
C4 SERPL-MCNC: 31 MG/DL
CALCIUM SERPL-MCNC: 9.7 MG/DL
CALCIUM SERPL-MCNC: 9.7 MG/DL
CARDIOLIPIN AB SER IA-ACNC: POSITIVE
CCP AB SER IA-ACNC: <8 UNITS
CHLORIDE SERPL-SCNC: 106 MMOL/L
CK SERPL-CCNC: 349 U/L
CO2 SERPL-SCNC: 25 MMOL/L
COLOR: YELLOW
CONFIRM: 30.4 SEC
CREAT SERPL-MCNC: 0.68 MG/DL
CRP SERPL-MCNC: <3 MG/L
DRVVT IMM 1:2 NP PPP: NORMAL
DRVVT SCREEN TO CONFIRM RATIO: 1.04 RATIO
DSDNA AB SER-ACNC: <12 IU/ML
EGFR: 100 ML/MIN/1.73M2
ERYTHROCYTE [SEDIMENTATION RATE] IN BLOOD BY WESTERGREN METHOD: 3 MM/HR
GLUCOSE QUALITATIVE U: NEGATIVE MG/DL
GLUCOSE SERPL-MCNC: 117 MG/DL
KETONES URINE: NEGATIVE MG/DL
LEUKOCYTE ESTERASE URINE: ABNORMAL
MAGNESIUM SERPL-MCNC: 2.3 MG/DL
NITRITE URINE: NEGATIVE
PARATHYROID HORMONE INTACT: 35 PG/ML
PH URINE: 6.5
POTASSIUM SERPL-SCNC: 4.4 MMOL/L
PROT SERPL-MCNC: 7.4 G/DL
PROTEIN URINE: NEGATIVE MG/DL
RF+CCP IGG SER-IMP: NEGATIVE
RHEUMATOID FACT SER QL: <10 IU/ML
SCREEN DRVVT: 37.9 SEC
SODIUM SERPL-SCNC: 143 MMOL/L
SPECIFIC GRAVITY URINE: 1.01
THYROGLOB AB SERPL-ACNC: <20 IU/ML
THYROPEROXIDASE AB SERPL IA-ACNC: <10 IU/ML
TSH SERPL-ACNC: 2.01 UIU/ML
UROBILINOGEN URINE: 0.2 MG/DL

## 2023-06-08 LAB
ANA SER IF-ACNC: NEGATIVE
B2 GLYCOPROT1 IGA SERPL IA-ACNC: <5 SAU
B2 GLYCOPROT1 IGG SER-ACNC: <5 SGU
B2 GLYCOPROT1 IGM SER-ACNC: <5 SMU
CARDIOLIPIN IGM SER-MCNC: 7.1 MPL
CARDIOLIPIN IGM SER-MCNC: <5 GPL
CENTROMERE IGG SER-ACNC: <0.2 AL
ENA RNP AB SER IA-ACNC: 1.6 AL
ENA SCL70 IGG SER IA-ACNC: <0.2 AL
ENA SM AB SER IA-ACNC: <0.2 AL
ENA SS-A AB SER IA-ACNC: <0.2 AL
ENA SS-B AB SER IA-ACNC: <0.2 AL

## 2023-06-09 ENCOUNTER — APPOINTMENT (OUTPATIENT)
Dept: PULMONOLOGY | Facility: CLINIC | Age: 58
End: 2023-06-09
Payer: MEDICARE

## 2023-06-09 VITALS — HEART RATE: 73 BPM | SYSTOLIC BLOOD PRESSURE: 106 MMHG | DIASTOLIC BLOOD PRESSURE: 71 MMHG | OXYGEN SATURATION: 100 %

## 2023-06-09 DIAGNOSIS — J44.9 CHRONIC OBSTRUCTIVE PULMONARY DISEASE, UNSPECIFIED: ICD-10-CM

## 2023-06-09 DIAGNOSIS — M06.9 RHEUMATOID ARTHRITIS, UNSPECIFIED: ICD-10-CM

## 2023-06-09 PROCEDURE — 94729 DIFFUSING CAPACITY: CPT

## 2023-06-09 PROCEDURE — 94727 GAS DIL/WSHOT DETER LNG VOL: CPT

## 2023-06-09 PROCEDURE — 99214 OFFICE O/P EST MOD 30 MIN: CPT | Mod: 25

## 2023-06-09 PROCEDURE — 94010 BREATHING CAPACITY TEST: CPT

## 2023-06-09 PROCEDURE — ZZZZZ: CPT

## 2023-06-09 NOTE — DISCUSSION/SUMMARY
[FreeTextEntry1] : COPD with decrease in smoking.  Clinically and functionally stable.  Manifested predominantly by diffusion impairment.\par Coronary calcifications\par STEPHANI on CPAP doing well.\par RA vs MCTD.  No evidence of pulmonary involvement.\par \par

## 2023-06-09 NOTE — ASSESSMENT
[FreeTextEntry1] : Continue CPAP present settings.\par Follow-up in 6 months. \par Discontinue smoking.\par Consider ENT evaluation regarding supine throat symptoms.\par CT screening 1 year from last Nov 2023\par \par \par 35 minutes spent in evaluation management and review of studies.\par \par

## 2023-06-09 NOTE — PROCEDURE
[FreeTextEntry1] : CPAP data reviewed and discussed.  Compliance good.\par 06/09/2023\par Pulmonary function testing\par Normal Flow Rates Normal Lung Volumes. Normal Lung Volumes. There is a mild-mod diffusion impairment \par No significant change in function compared to April 2022.\par \par CAT scan of November 15, 2022 reviewed and discussed with patient.  Lung RADS 1.\par Does have coronary calcifications.\par \par \par \par \par \par 04/19/2022\par Pulmonary function testing\par Normal Flow Rates There is evidence of mild hyperinflation. There is a mild diffusion impairment. \par Compared to prior study of October 2021 there is a mild decrement in FEV1 and mild increase in diffusion capacity.\par \par CT screening .Nov ,8 2021 reviewed\par

## 2023-06-09 NOTE — HISTORY OF PRESENT ILLNESS
[TextBox_4] : only smokes One or none cigarettes a day, using Nicorette lozenges prn and MYLE low dose\par no sob using Allegra AND Singulair for allergies\par feels if lies down flat feels upper airway closes, happening more often. Upon lying down. Prior to putting CPAP on. \par walking regularly \par \par saw cardio and had negative stress test\par \par had Cpap with the sleep lopes (cloth) new machine last year [employer]\par Doing well with new machine \par \par has a intrathecal pump with hydromorphone and Buminate. Pain controlled.\par Denies GERD.  \par \par \par \par

## 2023-06-12 LAB
ALBUMIN MFR SERPL ELPH: 62.2 %
ALBUMIN SERPL-MCNC: 4.6 G/DL
ALBUMIN/GLOB SERPL: 1.6 RATIO
ALPHA1 GLOB MFR SERPL ELPH: 4.4 %
ALPHA1 GLOB SERPL ELPH-MCNC: 0.3 G/DL
ALPHA2 GLOB MFR SERPL ELPH: 10.7 %
ALPHA2 GLOB SERPL ELPH-MCNC: 0.8 G/DL
B-GLOBULIN MFR SERPL ELPH: 11 %
B-GLOBULIN SERPL ELPH-MCNC: 0.8 G/DL
G6PD SER-CCNC: 12.3 U/G HGB
GAMMA GLOB FLD ELPH-MCNC: 0.9 G/DL
GAMMA GLOB MFR SERPL ELPH: 11.7 %
INTERPRETATION SERPL IEP-IMP: NORMAL
M PROTEIN SPEC IFE-MCNC: NORMAL
PROT SERPL-MCNC: 7.4 G/DL
PROT SERPL-MCNC: 7.4 G/DL

## 2023-07-01 ENCOUNTER — OUTPATIENT (OUTPATIENT)
Dept: OUTPATIENT SERVICES | Facility: HOSPITAL | Age: 58
LOS: 1 days | End: 2023-07-01
Payer: MEDICARE

## 2023-07-01 ENCOUNTER — APPOINTMENT (OUTPATIENT)
Dept: RADIOLOGY | Facility: CLINIC | Age: 58
End: 2023-07-01
Payer: MEDICARE

## 2023-07-01 DIAGNOSIS — D68.61 ANTIPHOSPHOLIPID SYNDROME: ICD-10-CM

## 2023-07-01 DIAGNOSIS — R29.898 OTHER SYMPTOMS AND SIGNS INVOLVING THE MUSCULOSKELETAL SYSTEM: Chronic | ICD-10-CM

## 2023-07-01 DIAGNOSIS — M26.51 ABNORMAL JAW CLOSURE: Chronic | ICD-10-CM

## 2023-07-01 DIAGNOSIS — M77.9 ENTHESOPATHY, UNSPECIFIED: Chronic | ICD-10-CM

## 2023-07-01 DIAGNOSIS — M94.20 CHONDROMALACIA, UNSPECIFIED SITE: Chronic | ICD-10-CM

## 2023-07-01 DIAGNOSIS — C44.92 SQUAMOUS CELL CARCINOMA OF SKIN, UNSPECIFIED: Chronic | ICD-10-CM

## 2023-07-01 DIAGNOSIS — N80.0 ENDOMETRIOSIS OF UTERUS: Chronic | ICD-10-CM

## 2023-07-01 PROCEDURE — 73130 X-RAY EXAM OF HAND: CPT | Mod: 26,50

## 2023-07-01 PROCEDURE — 73110 X-RAY EXAM OF WRIST: CPT

## 2023-07-01 PROCEDURE — 73110 X-RAY EXAM OF WRIST: CPT | Mod: 26,50

## 2023-07-01 PROCEDURE — 73130 X-RAY EXAM OF HAND: CPT

## 2023-07-18 ENCOUNTER — APPOINTMENT (OUTPATIENT)
Dept: RHEUMATOLOGY | Facility: CLINIC | Age: 58
End: 2023-07-18
Payer: MEDICARE

## 2023-07-18 VITALS
HEIGHT: 62 IN | WEIGHT: 126 LBS | SYSTOLIC BLOOD PRESSURE: 112 MMHG | HEART RATE: 73 BPM | BODY MASS INDEX: 23.19 KG/M2 | DIASTOLIC BLOOD PRESSURE: 70 MMHG | OXYGEN SATURATION: 97 %

## 2023-07-18 PROCEDURE — 99215 OFFICE O/P EST HI 40 MIN: CPT

## 2023-07-18 RX ORDER — DICLOFENAC SODIUM 10 MG/G
1 GEL TOPICAL
Refills: 0 | Status: DISCONTINUED | COMMUNITY
End: 2023-07-18

## 2023-07-18 RX ORDER — ASPIRIN 81 MG
81 TABLET,CHEWABLE ORAL
Refills: 0 | Status: ACTIVE | COMMUNITY

## 2023-07-18 RX ORDER — BUTALBITAL, ACETAMINOPHEN AND CAFFEINE 325; 50; 40 MG/1; MG/1; MG/1
50-325-40 TABLET ORAL
Qty: 30 | Refills: 0 | Status: DISCONTINUED | COMMUNITY
Start: 2020-11-12 | End: 2023-07-18

## 2023-07-18 RX ORDER — SULINDAC 150 MG/1
150 TABLET ORAL
Refills: 0 | Status: ACTIVE | COMMUNITY

## 2023-08-08 NOTE — ASU PATIENT PROFILE, ADULT - NS PRO ABUSE SCREEN SUSPICION NEGLECT YN
no Detail Level: Detailed Lesion Type: Abscess Method: 15 blade Curette: No Anesthesia Type: 1% lidocaine with epinephrine Size Of Lesion In Cm (Optional But May Be Required For Some Insurances): 0 Wound Care: Petrolatum Dressing: dry sterile dressing Epidermal Sutures: 4-0 Ethilon Epidermal Closure: simple interrupted Suture Text: The incision was partially closed with Preparation Text: The area was prepped in the usual clean fashion. Curette Text (Optional): After the contents were expressed a curette was used to partially remove the cyst wall. Consent was obtained and risks were reviewed including but not limited to delayed wound healing, infection, need for multiple I and D's, and pain. Post-Care Instructions: I reviewed with the patient in detail post-care instructions. Patient should keep wound covered and call the office should any redness, pain, swelling or worsening occur.

## 2023-08-23 ENCOUNTER — TRANSCRIPTION ENCOUNTER (OUTPATIENT)
Age: 58
End: 2023-08-23

## 2023-09-13 ENCOUNTER — APPOINTMENT (OUTPATIENT)
Dept: RHEUMATOLOGY | Facility: CLINIC | Age: 58
End: 2023-09-13
Payer: MEDICARE

## 2023-09-13 VITALS
OXYGEN SATURATION: 99 % | DIASTOLIC BLOOD PRESSURE: 99 MMHG | TEMPERATURE: 98.7 F | BODY MASS INDEX: 22.82 KG/M2 | HEART RATE: 74 BPM | WEIGHT: 124 LBS | HEIGHT: 62 IN | SYSTOLIC BLOOD PRESSURE: 152 MMHG

## 2023-09-13 PROCEDURE — 99214 OFFICE O/P EST MOD 30 MIN: CPT | Mod: 25

## 2023-09-13 PROCEDURE — 96372 THER/PROPH/DIAG INJ SC/IM: CPT

## 2023-09-13 RX ORDER — TRIAMCINOLONE ACETONIDE 80 MG/ML
80 INJECTION, SUSPENSION INTRA-ARTICULAR; INTRAMUSCULAR
Qty: 8 | Refills: 0 | Status: COMPLETED | OUTPATIENT
Start: 2023-09-13

## 2023-09-13 RX ADMIN — TRIAMCINOLONE ACETONIDE 0 MG/ML: 80 INJECTION, SUSPENSION INTRA-ARTICULAR; INTRAMUSCULAR at 00:00

## 2023-11-14 ENCOUNTER — APPOINTMENT (OUTPATIENT)
Dept: PULMONOLOGY | Facility: CLINIC | Age: 58
End: 2023-11-14
Payer: MEDICARE

## 2023-11-14 VITALS — SYSTOLIC BLOOD PRESSURE: 116 MMHG | HEART RATE: 77 BPM | OXYGEN SATURATION: 94 % | DIASTOLIC BLOOD PRESSURE: 75 MMHG

## 2023-11-14 DIAGNOSIS — G47.33 OBSTRUCTIVE SLEEP APNEA (ADULT) (PEDIATRIC): ICD-10-CM

## 2023-11-14 PROCEDURE — 99213 OFFICE O/P EST LOW 20 MIN: CPT

## 2023-11-16 PROBLEM — G47.33 OSA ON CPAP: Status: ACTIVE | Noted: 2018-09-29

## 2023-11-29 ENCOUNTER — APPOINTMENT (OUTPATIENT)
Dept: RHEUMATOLOGY | Facility: CLINIC | Age: 58
End: 2023-11-29
Payer: MEDICARE

## 2023-11-29 DIAGNOSIS — M85.80 OTHER SPECIFIED DISORDERS OF BONE DENSITY AND STRUCTURE, UNSPECIFIED SITE: ICD-10-CM

## 2023-11-29 PROCEDURE — 99214 OFFICE O/P EST MOD 30 MIN: CPT

## 2023-11-29 RX ORDER — PREDNISONE 5 MG/1
5 TABLET ORAL
Qty: 70 | Refills: 0 | Status: DISCONTINUED | COMMUNITY
Start: 2023-07-18 | End: 2023-11-29

## 2023-11-30 LAB
ALBUMIN SERPL ELPH-MCNC: 4.4 G/DL
ALP BLD-CCNC: 86 U/L
ALT SERPL-CCNC: 24 U/L
ANION GAP SERPL CALC-SCNC: 11 MMOL/L
AST SERPL-CCNC: 22 U/L
BASOPHILS # BLD AUTO: 0.03 K/UL
BASOPHILS NFR BLD AUTO: 0.6 %
BILIRUB SERPL-MCNC: 0.6 MG/DL
BUN SERPL-MCNC: 19 MG/DL
CALCIUM SERPL-MCNC: 9.8 MG/DL
CHLORIDE SERPL-SCNC: 106 MMOL/L
CO2 SERPL-SCNC: 27 MMOL/L
CREAT SERPL-MCNC: 0.62 MG/DL
CRP SERPL-MCNC: <3 MG/L
EGFR: 102 ML/MIN/1.73M2
EOSINOPHIL # BLD AUTO: 0.17 K/UL
EOSINOPHIL NFR BLD AUTO: 3.5 %
ERYTHROCYTE [SEDIMENTATION RATE] IN BLOOD BY WESTERGREN METHOD: 10 MM/HR
GLUCOSE SERPL-MCNC: 92 MG/DL
HAV IGM SER QL: NONREACTIVE
HBV CORE IGM SER QL: NONREACTIVE
HBV SURFACE AG SER QL: NONREACTIVE
HCT VFR BLD CALC: 40.9 %
HCV AB SER QL: NONREACTIVE
HCV S/CO RATIO: 0.11 S/CO
HGB BLD-MCNC: 12.7 G/DL
IMM GRANULOCYTES NFR BLD AUTO: 0.2 %
LYMPHOCYTES # BLD AUTO: 1.27 K/UL
LYMPHOCYTES NFR BLD AUTO: 26 %
MAN DIFF?: NORMAL
MCHC RBC-ENTMCNC: 30.3 PG
MCHC RBC-ENTMCNC: 31.1 GM/DL
MCV RBC AUTO: 97.6 FL
MONOCYTES # BLD AUTO: 0.4 K/UL
MONOCYTES NFR BLD AUTO: 8.2 %
NEUTROPHILS # BLD AUTO: 3 K/UL
NEUTROPHILS NFR BLD AUTO: 61.5 %
PLATELET # BLD AUTO: 191 K/UL
POTASSIUM SERPL-SCNC: 5.2 MMOL/L
PROT SERPL-MCNC: 6.8 G/DL
RBC # BLD: 4.19 M/UL
RBC # FLD: 13 %
SODIUM SERPL-SCNC: 144 MMOL/L
WBC # FLD AUTO: 4.88 K/UL

## 2023-12-04 LAB
A PHAGOCYTOPH IGG TITR SER IF: NORMAL TITER
B BURGDOR AB SER QL IA: NEGATIVE
B MICROTI IGG TITR SER: NORMAL TITER
E CHAFFEENSIS IGG TITR SER IF: NORMAL TITER

## 2023-12-12 ENCOUNTER — TRANSCRIPTION ENCOUNTER (OUTPATIENT)
Age: 58
End: 2023-12-12

## 2023-12-12 RX ORDER — LEFLUNOMIDE 10 MG/1
10 TABLET, FILM COATED ORAL DAILY
Qty: 30 | Refills: 2 | Status: DISCONTINUED | COMMUNITY
Start: 2023-09-13 | End: 2023-12-12

## 2023-12-14 ENCOUNTER — APPOINTMENT (OUTPATIENT)
Dept: RADIOLOGY | Facility: CLINIC | Age: 58
End: 2023-12-14
Payer: MEDICARE

## 2023-12-14 ENCOUNTER — OUTPATIENT (OUTPATIENT)
Dept: OUTPATIENT SERVICES | Facility: HOSPITAL | Age: 58
LOS: 1 days | End: 2023-12-14
Payer: MEDICARE

## 2023-12-14 DIAGNOSIS — M94.20 CHONDROMALACIA, UNSPECIFIED SITE: Chronic | ICD-10-CM

## 2023-12-14 DIAGNOSIS — R29.898 OTHER SYMPTOMS AND SIGNS INVOLVING THE MUSCULOSKELETAL SYSTEM: Chronic | ICD-10-CM

## 2023-12-14 DIAGNOSIS — Z00.8 ENCOUNTER FOR OTHER GENERAL EXAMINATION: ICD-10-CM

## 2023-12-14 DIAGNOSIS — M77.9 ENTHESOPATHY, UNSPECIFIED: Chronic | ICD-10-CM

## 2023-12-14 DIAGNOSIS — M26.51 ABNORMAL JAW CLOSURE: Chronic | ICD-10-CM

## 2023-12-14 DIAGNOSIS — N80.0 ENDOMETRIOSIS OF UTERUS: Chronic | ICD-10-CM

## 2023-12-14 DIAGNOSIS — C44.92 SQUAMOUS CELL CARCINOMA OF SKIN, UNSPECIFIED: Chronic | ICD-10-CM

## 2023-12-14 PROCEDURE — 77080 DXA BONE DENSITY AXIAL: CPT

## 2023-12-14 PROCEDURE — 77080 DXA BONE DENSITY AXIAL: CPT | Mod: 26

## 2024-01-02 ENCOUNTER — NON-APPOINTMENT (OUTPATIENT)
Age: 59
End: 2024-01-02

## 2024-01-02 VITALS — BODY MASS INDEX: 22.82 KG/M2 | WEIGHT: 124 LBS | HEIGHT: 62 IN

## 2024-01-02 DIAGNOSIS — F17.200 NICOTINE DEPENDENCE, UNSPECIFIED, UNCOMPLICATED: ICD-10-CM

## 2024-01-02 DIAGNOSIS — Z80.1 FAMILY HISTORY OF MALIGNANT NEOPLASM OF TRACHEA, BRONCHUS AND LUNG: ICD-10-CM

## 2024-01-02 NOTE — HISTORY OF PRESENT ILLNESS
[Current] : Current [TextBox_13] : Patient is scheduled for a annual  LDCT for lung cancer screening. Chart review performed to confirm eligibility for LDCT.    No documented personal history of lung cancer. No documented s/s of lung cancer. Patient is a current smoker with a 33 pack year hx. [PacksperYear] : 33

## 2024-01-04 ENCOUNTER — OUTPATIENT (OUTPATIENT)
Dept: OUTPATIENT SERVICES | Facility: HOSPITAL | Age: 59
LOS: 1 days | End: 2024-01-04
Payer: MEDICARE

## 2024-01-04 ENCOUNTER — APPOINTMENT (OUTPATIENT)
Dept: CT IMAGING | Facility: CLINIC | Age: 59
End: 2024-01-04
Payer: MEDICARE

## 2024-01-04 DIAGNOSIS — M77.9 ENTHESOPATHY, UNSPECIFIED: Chronic | ICD-10-CM

## 2024-01-04 DIAGNOSIS — M94.20 CHONDROMALACIA, UNSPECIFIED SITE: Chronic | ICD-10-CM

## 2024-01-04 DIAGNOSIS — N80.0 ENDOMETRIOSIS OF UTERUS: Chronic | ICD-10-CM

## 2024-01-04 DIAGNOSIS — C44.92 SQUAMOUS CELL CARCINOMA OF SKIN, UNSPECIFIED: Chronic | ICD-10-CM

## 2024-01-04 DIAGNOSIS — Z00.8 ENCOUNTER FOR OTHER GENERAL EXAMINATION: ICD-10-CM

## 2024-01-04 DIAGNOSIS — M26.51 ABNORMAL JAW CLOSURE: Chronic | ICD-10-CM

## 2024-01-04 DIAGNOSIS — R29.898 OTHER SYMPTOMS AND SIGNS INVOLVING THE MUSCULOSKELETAL SYSTEM: Chronic | ICD-10-CM

## 2024-01-04 PROCEDURE — 71271 CT THORAX LUNG CANCER SCR C-: CPT | Mod: 26

## 2024-01-04 PROCEDURE — 71271 CT THORAX LUNG CANCER SCR C-: CPT

## 2024-01-09 ENCOUNTER — TRANSCRIPTION ENCOUNTER (OUTPATIENT)
Age: 59
End: 2024-01-09

## 2024-01-23 NOTE — PRE-ANESTHESIA EVALUATION ADULT - BP NONINVASIVE SYSTOLIC (MM HG)
Appt established.    In an effort to ensure that our patients [employer], a Team Member has reviewed your chart and identified an opportunity to provide the best care possible. An attempt was made to discuss or schedule overdue Preventive or Disease Management screening.     The Outcome was Contact was made, appointment scheduled. Care Gaps include Wellness Visits.     96

## 2024-02-06 ENCOUNTER — OUTPATIENT (OUTPATIENT)
Dept: OUTPATIENT SERVICES | Facility: HOSPITAL | Age: 59
LOS: 1 days | End: 2024-02-06
Payer: MEDICARE

## 2024-02-06 ENCOUNTER — APPOINTMENT (OUTPATIENT)
Dept: MAMMOGRAPHY | Facility: CLINIC | Age: 59
End: 2024-02-06
Payer: MEDICARE

## 2024-02-06 DIAGNOSIS — Z12.31 ENCOUNTER FOR SCREENING MAMMOGRAM FOR MALIGNANT NEOPLASM OF BREAST: ICD-10-CM

## 2024-02-06 DIAGNOSIS — M26.51 ABNORMAL JAW CLOSURE: Chronic | ICD-10-CM

## 2024-02-06 DIAGNOSIS — M77.9 ENTHESOPATHY, UNSPECIFIED: Chronic | ICD-10-CM

## 2024-02-06 DIAGNOSIS — N80.0 ENDOMETRIOSIS OF UTERUS: Chronic | ICD-10-CM

## 2024-02-06 DIAGNOSIS — M94.20 CHONDROMALACIA, UNSPECIFIED SITE: Chronic | ICD-10-CM

## 2024-02-06 DIAGNOSIS — R29.898 OTHER SYMPTOMS AND SIGNS INVOLVING THE MUSCULOSKELETAL SYSTEM: Chronic | ICD-10-CM

## 2024-02-06 DIAGNOSIS — C44.92 SQUAMOUS CELL CARCINOMA OF SKIN, UNSPECIFIED: Chronic | ICD-10-CM

## 2024-02-06 PROCEDURE — 77067 SCR MAMMO BI INCL CAD: CPT | Mod: 26

## 2024-02-06 PROCEDURE — 77063 BREAST TOMOSYNTHESIS BI: CPT | Mod: 26

## 2024-02-06 PROCEDURE — 77063 BREAST TOMOSYNTHESIS BI: CPT

## 2024-02-06 PROCEDURE — 77067 SCR MAMMO BI INCL CAD: CPT

## 2024-02-28 ENCOUNTER — APPOINTMENT (OUTPATIENT)
Dept: RHEUMATOLOGY | Facility: CLINIC | Age: 59
End: 2024-02-28
Payer: MEDICARE

## 2024-02-28 VITALS
SYSTOLIC BLOOD PRESSURE: 121 MMHG | OXYGEN SATURATION: 98 % | WEIGHT: 118 LBS | HEIGHT: 62 IN | HEART RATE: 72 BPM | BODY MASS INDEX: 21.71 KG/M2 | DIASTOLIC BLOOD PRESSURE: 82 MMHG | TEMPERATURE: 98.4 F

## 2024-02-28 PROCEDURE — 20610 DRAIN/INJ JOINT/BURSA W/O US: CPT | Mod: RT

## 2024-02-28 PROCEDURE — 99215 OFFICE O/P EST HI 40 MIN: CPT | Mod: 25

## 2024-02-28 RX ORDER — TRIAMCINOLONE ACETONIDE 80 MG/ML
80 INJECTION, SUSPENSION INTRA-ARTICULAR; INTRAMUSCULAR
Qty: 1 | Refills: 0 | Status: COMPLETED | OUTPATIENT
Start: 2024-02-28

## 2024-02-28 RX ORDER — HYDROXYCHLOROQUINE SULFATE 200 MG/1
200 TABLET, FILM COATED ORAL
Qty: 60 | Refills: 2 | Status: DISCONTINUED | COMMUNITY
Start: 2023-07-18 | End: 2024-02-28

## 2024-02-28 RX ADMIN — TRIAMCINOLONE ACETONIDE 0 MG/ML: 80 INJECTION, SUSPENSION INTRA-ARTICULAR; INTRAMUSCULAR at 00:00

## 2024-03-27 DIAGNOSIS — M25.561 PAIN IN RIGHT KNEE: ICD-10-CM

## 2024-04-02 ENCOUNTER — OUTPATIENT (OUTPATIENT)
Dept: OUTPATIENT SERVICES | Facility: HOSPITAL | Age: 59
LOS: 1 days | End: 2024-04-02
Payer: MEDICARE

## 2024-04-02 ENCOUNTER — RESULT REVIEW (OUTPATIENT)
Age: 59
End: 2024-04-02

## 2024-04-02 ENCOUNTER — APPOINTMENT (OUTPATIENT)
Dept: MRI IMAGING | Facility: CLINIC | Age: 59
End: 2024-04-02
Payer: MEDICARE

## 2024-04-02 DIAGNOSIS — M77.9 ENTHESOPATHY, UNSPECIFIED: Chronic | ICD-10-CM

## 2024-04-02 DIAGNOSIS — M94.20 CHONDROMALACIA, UNSPECIFIED SITE: Chronic | ICD-10-CM

## 2024-04-02 DIAGNOSIS — Z00.8 ENCOUNTER FOR OTHER GENERAL EXAMINATION: ICD-10-CM

## 2024-04-02 DIAGNOSIS — R29.898 OTHER SYMPTOMS AND SIGNS INVOLVING THE MUSCULOSKELETAL SYSTEM: Chronic | ICD-10-CM

## 2024-04-02 DIAGNOSIS — C44.92 SQUAMOUS CELL CARCINOMA OF SKIN, UNSPECIFIED: Chronic | ICD-10-CM

## 2024-04-02 DIAGNOSIS — M26.51 ABNORMAL JAW CLOSURE: Chronic | ICD-10-CM

## 2024-04-02 DIAGNOSIS — N80.0 ENDOMETRIOSIS OF UTERUS: Chronic | ICD-10-CM

## 2024-04-02 PROCEDURE — 73721 MRI JNT OF LWR EXTRE W/O DYE: CPT

## 2024-04-02 PROCEDURE — 73221 MRI JOINT UPR EXTREM W/O DYE: CPT | Mod: MH

## 2024-04-02 PROCEDURE — 73721 MRI JNT OF LWR EXTRE W/O DYE: CPT | Mod: 26,RT,MH

## 2024-04-02 PROCEDURE — 73221 MRI JOINT UPR EXTREM W/O DYE: CPT | Mod: 26,RT,MH

## 2024-04-19 ENCOUNTER — NON-APPOINTMENT (OUTPATIENT)
Age: 59
End: 2024-04-19

## 2024-04-24 ENCOUNTER — NON-APPOINTMENT (OUTPATIENT)
Age: 59
End: 2024-04-24

## 2024-04-24 RX ORDER — FOLIC ACID 1 MG/1
1 TABLET ORAL
Qty: 90 | Refills: 1 | Status: ACTIVE | COMMUNITY
Start: 2024-02-28 | End: 1900-01-01

## 2024-04-25 ENCOUNTER — APPOINTMENT (OUTPATIENT)
Dept: ORTHOPEDIC SURGERY | Facility: CLINIC | Age: 59
End: 2024-04-25

## 2024-05-03 ENCOUNTER — APPOINTMENT (OUTPATIENT)
Dept: RHEUMATOLOGY | Facility: CLINIC | Age: 59
End: 2024-05-03
Payer: MEDICARE

## 2024-05-03 VITALS
HEART RATE: 73 BPM | OXYGEN SATURATION: 98 % | SYSTOLIC BLOOD PRESSURE: 149 MMHG | HEIGHT: 62 IN | DIASTOLIC BLOOD PRESSURE: 78 MMHG | BODY MASS INDEX: 20.98 KG/M2 | WEIGHT: 114 LBS

## 2024-05-03 PROCEDURE — 96372 THER/PROPH/DIAG INJ SC/IM: CPT

## 2024-05-03 PROCEDURE — 99214 OFFICE O/P EST MOD 30 MIN: CPT | Mod: 25

## 2024-05-03 RX ORDER — LEFLUNOMIDE 20 MG/1
20 TABLET, FILM COATED ORAL DAILY
Qty: 90 | Refills: 0 | Status: DISCONTINUED | COMMUNITY
Start: 2023-11-29 | End: 2024-05-03

## 2024-05-03 RX ORDER — TRIAMCINOLONE ACETONIDE 80 MG/ML
80 INJECTION, SUSPENSION INTRA-ARTICULAR; INTRAMUSCULAR
Qty: 8 | Refills: 0 | Status: COMPLETED | OUTPATIENT
Start: 2024-05-03

## 2024-05-03 RX ADMIN — TRIAMCINOLONE ACETONIDE 0 MG/ML: 80 INJECTION, SUSPENSION INTRA-ARTICULAR; INTRAMUSCULAR at 00:00

## 2024-05-03 NOTE — ASSESSMENT
[FreeTextEntry1] : 59-year-old female with multiple comorbidities including anemia, carpal tunnel syndrome status post surgery in 2000, underactive thyroid, lymphedema fibromyalgia, polyarticular osteoarthritis, Paget's disease, CRPS of the left shoulder, costochondritis, chronic neck and low back pain, osteopenia, rheumatoid arthritis, lupus, sleep apnea, positive antiphospholipid antibodies, pediatric headache syndrome, restless leg syndrome, anxiety here today for follow-up visit  Rheumatoid arthritis: -Established care with our practice in June 2023 -Was previously under the care of Dr. Peterson with the [facility] most recently, Prior to that was under the care of Dr. Carias for many years, last follow-up with her was December 2021 and prior to that was under the care of Dr. Brantley -Has been on no medications, no DMARDs or Biologics for her multiple rheumatologic diagnoses -Suspect seronegative rheumatoid arthritis(polyarthralgia, negative rheumatoid factor, CCP, normal ESR and CRP, dramatic response to steroids in the past). No clinical or laboratory evidence of SLE, scleroderma, polymyositis at this time. -Labs/serologies negative except for elevated CPK of 349 and positive RNP - X-rays of bilateral hands and wrists from 7/1/2023 normal -Dramatic response to prednisone -Rash with Plaquenil - self discontinued leflunomide due to side effect of severe mental fog which resolved after she discontinued leflunomide, no change in her joint symptoms on leflunomide either -IM Kenalog given in the office today -Started taking methotrexate 10 mg a week after last visit in February, tolerating well, no reported side effects.  Increase methotrexate dose to 15 mg a week for 4 weeks and then 20 mg a week after until next visit   High risk medications: - Monitor labs   Persistent right knee pain: -Status post injury 3 months ago -Had minimal response to right knee aspiration and injection given last visit  -Scheduled to see orthopedics  Positive antiphospholipid antibodies: -Per history, APL work-up from 6/6/2023 negative - No events - On daily baby aspirin per cardiology  Fibromyalgia: - Has failed Savella. Side effects with Cymbalta and Lyrica. - Currently on trazodone for poor sleep and Xanax for anxiety  Raynaud's: - Well-controlled with lifestyle modifications - Smoking cessation strongly encouraged again  Polyarticular osteoarthritis: - Had relief with PT and OT which she did last year, continue exercises at home  Osteopenia/Paget's disease: -History of osteoporosis -DEXA from 12/10/2021 with osteopenia. Check DEXA, prescription given - Received 1 dose of Reclast in 2017 - Failed Fosamax, Boniva, Actonel and Prolia - Secondary osteoporosis work-up normal  Chronic neck and low back pain/CRPS/costochondritis: - Under the care of pain management, currently has a pain pump - Has had surgical fusion of C2-C7 and L4-S1  All questions and concerns addressed my best possible ability, patient verbalizes understanding and is agreeable  Follow-up in 8 weeks or sooner as needed

## 2024-05-03 NOTE — DATA REVIEWED
[FreeTextEntry1] : Labs reviewed  DEXA from 12/10/2021, report reviewed AP spine T score -0.6, left femoral neck T score of -1.8 and left total hip T score -1.4

## 2024-05-03 NOTE — HISTORY OF PRESENT ILLNESS
[FreeTextEntry1] : Still has persistent right knee pain, has orthopedic appointment scheduled.  Had short-lived relief after right knee aspirated and injected with cortisone at her last visit in February Tolerating methotrexate well, currently taking 10 mg a week, no reported side effects.  Taking folic acid regularly as well No change in joint symptoms Complains of diffuse joint pain, morning stiffness of 20 to 30 minutes, swelling in her hands and ankles on and off unchanged No new symptoms Otherwise review of systems unchanged from prior visit

## 2024-05-03 NOTE — PROCEDURE
[FreeTextEntry1] : Injection performed: IM Kenalog injection Date: 5/3/2024 The risks and benefits were discussed with the patient Consent was obtained The area was prepped with alcohol and Betadine solution Needle used: 25-gauge 1 inch needle Injection: 80 mg of Kenalog injected intra-articularly into the left deltoid muscle Patient tolerated procedure well There were no complications Patient was instructed to call if redness at site, decreased range of motion or any increase in pain is noted after the procedure

## 2024-05-06 LAB
ALBUMIN SERPL ELPH-MCNC: 4.6 G/DL
ALP BLD-CCNC: 82 U/L
ALT SERPL-CCNC: 19 U/L
ANION GAP SERPL CALC-SCNC: 11 MMOL/L
AST SERPL-CCNC: 21 U/L
BASOPHILS # BLD AUTO: 0.05 K/UL
BASOPHILS NFR BLD AUTO: 0.6 %
BILIRUB SERPL-MCNC: 0.6 MG/DL
BUN SERPL-MCNC: 21 MG/DL
CALCIUM SERPL-MCNC: 9.7 MG/DL
CHLORIDE SERPL-SCNC: 105 MMOL/L
CO2 SERPL-SCNC: 27 MMOL/L
CREAT SERPL-MCNC: 0.65 MG/DL
CRP SERPL-MCNC: <3 MG/L
EGFR: 100 ML/MIN/1.73M2
EOSINOPHIL # BLD AUTO: 0.32 K/UL
EOSINOPHIL NFR BLD AUTO: 3.5 %
ERYTHROCYTE [SEDIMENTATION RATE] IN BLOOD BY WESTERGREN METHOD: 3 MM/HR
GLUCOSE SERPL-MCNC: 100 MG/DL
HCT VFR BLD CALC: 40 %
HGB BLD-MCNC: 12.5 G/DL
IMM GRANULOCYTES NFR BLD AUTO: 0.3 %
LYMPHOCYTES # BLD AUTO: 2.21 K/UL
LYMPHOCYTES NFR BLD AUTO: 24.4 %
MAN DIFF?: NORMAL
MCHC RBC-ENTMCNC: 31.3 GM/DL
MCHC RBC-ENTMCNC: 31.6 PG
MCV RBC AUTO: 101.3 FL
MONOCYTES # BLD AUTO: 0.64 K/UL
MONOCYTES NFR BLD AUTO: 7.1 %
NEUTROPHILS # BLD AUTO: 5.82 K/UL
NEUTROPHILS NFR BLD AUTO: 64.1 %
PLATELET # BLD AUTO: 201 K/UL
POTASSIUM SERPL-SCNC: 4.8 MMOL/L
PROT SERPL-MCNC: 6.8 G/DL
RBC # BLD: 3.95 M/UL
RBC # FLD: 13.2 %
SODIUM SERPL-SCNC: 144 MMOL/L
WBC # FLD AUTO: 9.07 K/UL

## 2024-05-10 ENCOUNTER — TRANSCRIPTION ENCOUNTER (OUTPATIENT)
Age: 59
End: 2024-05-10

## 2024-05-29 ENCOUNTER — TRANSCRIPTION ENCOUNTER (OUTPATIENT)
Age: 59
End: 2024-05-29

## 2024-05-29 RX ORDER — METHOTREXATE 2.5 MG/1
2.5 TABLET ORAL
Qty: 104 | Refills: 1 | Status: ACTIVE | COMMUNITY
Start: 2024-02-28 | End: 1900-01-01

## 2024-07-15 ENCOUNTER — APPOINTMENT (OUTPATIENT)
Dept: MRI IMAGING | Facility: CLINIC | Age: 59
End: 2024-07-15

## 2024-07-15 ENCOUNTER — OUTPATIENT (OUTPATIENT)
Dept: OUTPATIENT SERVICES | Facility: HOSPITAL | Age: 59
LOS: 1 days | End: 2024-07-15
Payer: COMMERCIAL

## 2024-07-15 DIAGNOSIS — M77.9 ENTHESOPATHY, UNSPECIFIED: Chronic | ICD-10-CM

## 2024-07-15 DIAGNOSIS — N80.0 ENDOMETRIOSIS OF UTERUS: Chronic | ICD-10-CM

## 2024-07-15 DIAGNOSIS — M94.20 CHONDROMALACIA, UNSPECIFIED SITE: Chronic | ICD-10-CM

## 2024-07-15 DIAGNOSIS — Z00.00 ENCOUNTER FOR GENERAL ADULT MEDICAL EXAMINATION WITHOUT ABNORMAL FINDINGS: ICD-10-CM

## 2024-07-15 DIAGNOSIS — C44.92 SQUAMOUS CELL CARCINOMA OF SKIN, UNSPECIFIED: Chronic | ICD-10-CM

## 2024-07-15 DIAGNOSIS — M26.51 ABNORMAL JAW CLOSURE: Chronic | ICD-10-CM

## 2024-07-15 DIAGNOSIS — R29.898 OTHER SYMPTOMS AND SIGNS INVOLVING THE MUSCULOSKELETAL SYSTEM: Chronic | ICD-10-CM

## 2024-07-15 PROCEDURE — 73221 MRI JOINT UPR EXTREM W/O DYE: CPT | Mod: 26,1L,RT

## 2024-07-15 PROCEDURE — 72141 MRI NECK SPINE W/O DYE: CPT | Mod: 26,1L

## 2024-07-15 PROCEDURE — 73721 MRI JNT OF LWR EXTRE W/O DYE: CPT | Mod: 26,RT

## 2024-07-15 PROCEDURE — 73721 MRI JNT OF LWR EXTRE W/O DYE: CPT

## 2024-07-15 PROCEDURE — 73721 MRI JNT OF LWR EXTRE W/O DYE: CPT | Mod: 26,1L,RT,77

## 2024-07-15 PROCEDURE — 72141 MRI NECK SPINE W/O DYE: CPT

## 2024-07-15 PROCEDURE — 73221 MRI JOINT UPR EXTREM W/O DYE: CPT

## 2024-07-16 ENCOUNTER — APPOINTMENT (OUTPATIENT)
Dept: MRI IMAGING | Facility: CLINIC | Age: 59
End: 2024-07-16

## 2024-07-16 ENCOUNTER — OUTPATIENT (OUTPATIENT)
Dept: OUTPATIENT SERVICES | Facility: HOSPITAL | Age: 59
LOS: 1 days | End: 2024-07-16
Payer: COMMERCIAL

## 2024-07-16 DIAGNOSIS — M94.20 CHONDROMALACIA, UNSPECIFIED SITE: Chronic | ICD-10-CM

## 2024-07-16 DIAGNOSIS — C44.92 SQUAMOUS CELL CARCINOMA OF SKIN, UNSPECIFIED: Chronic | ICD-10-CM

## 2024-07-16 DIAGNOSIS — M77.9 ENTHESOPATHY, UNSPECIFIED: Chronic | ICD-10-CM

## 2024-07-16 DIAGNOSIS — Z00.00 ENCOUNTER FOR GENERAL ADULT MEDICAL EXAMINATION WITHOUT ABNORMAL FINDINGS: ICD-10-CM

## 2024-07-16 PROCEDURE — 73221 MRI JOINT UPR EXTREM W/O DYE: CPT | Mod: 26,RT

## 2024-07-16 PROCEDURE — 73721 MRI JNT OF LWR EXTRE W/O DYE: CPT

## 2024-07-16 PROCEDURE — 73721 MRI JNT OF LWR EXTRE W/O DYE: CPT | Mod: 26,RT

## 2024-07-16 PROCEDURE — 73221 MRI JOINT UPR EXTREM W/O DYE: CPT

## 2024-07-17 ENCOUNTER — OUTPATIENT (OUTPATIENT)
Dept: OUTPATIENT SERVICES | Facility: HOSPITAL | Age: 59
LOS: 1 days | End: 2024-07-17
Payer: COMMERCIAL

## 2024-07-17 ENCOUNTER — APPOINTMENT (OUTPATIENT)
Dept: MRI IMAGING | Facility: CLINIC | Age: 59
End: 2024-07-17

## 2024-07-17 DIAGNOSIS — C44.92 SQUAMOUS CELL CARCINOMA OF SKIN, UNSPECIFIED: Chronic | ICD-10-CM

## 2024-07-17 DIAGNOSIS — R29.898 OTHER SYMPTOMS AND SIGNS INVOLVING THE MUSCULOSKELETAL SYSTEM: Chronic | ICD-10-CM

## 2024-07-17 DIAGNOSIS — Z00.8 ENCOUNTER FOR OTHER GENERAL EXAMINATION: ICD-10-CM

## 2024-07-17 DIAGNOSIS — N80.0 ENDOMETRIOSIS OF UTERUS: Chronic | ICD-10-CM

## 2024-07-17 DIAGNOSIS — M26.51 ABNORMAL JAW CLOSURE: Chronic | ICD-10-CM

## 2024-07-17 DIAGNOSIS — M77.9 ENTHESOPATHY, UNSPECIFIED: Chronic | ICD-10-CM

## 2024-07-17 DIAGNOSIS — M94.20 CHONDROMALACIA, UNSPECIFIED SITE: Chronic | ICD-10-CM

## 2024-07-17 PROCEDURE — 72148 MRI LUMBAR SPINE W/O DYE: CPT | Mod: 26

## 2024-07-17 PROCEDURE — 72148 MRI LUMBAR SPINE W/O DYE: CPT

## 2024-07-30 ENCOUNTER — APPOINTMENT (OUTPATIENT)
Dept: RHEUMATOLOGY | Facility: CLINIC | Age: 59
End: 2024-07-30
Payer: MEDICARE

## 2024-07-30 ENCOUNTER — LABORATORY RESULT (OUTPATIENT)
Age: 59
End: 2024-07-30

## 2024-07-30 ENCOUNTER — APPOINTMENT (OUTPATIENT)
Dept: MRI IMAGING | Facility: CLINIC | Age: 59
End: 2024-07-30

## 2024-07-30 VITALS
DIASTOLIC BLOOD PRESSURE: 82 MMHG | HEART RATE: 78 BPM | BODY MASS INDEX: 21.53 KG/M2 | HEIGHT: 62 IN | SYSTOLIC BLOOD PRESSURE: 160 MMHG | OXYGEN SATURATION: 97 % | WEIGHT: 117 LBS

## 2024-07-30 PROCEDURE — 99215 OFFICE O/P EST HI 40 MIN: CPT | Mod: 25

## 2024-07-30 PROCEDURE — 96372 THER/PROPH/DIAG INJ SC/IM: CPT

## 2024-07-30 RX ORDER — TRIAMCINOLONE ACETONIDE 80 MG/ML
80 INJECTION, SUSPENSION INTRA-ARTICULAR; INTRAMUSCULAR
Qty: 8 | Refills: 0 | Status: COMPLETED | OUTPATIENT
Start: 2024-07-30

## 2024-07-30 RX ADMIN — TRIAMCINOLONE ACETONIDE 0 MG/ML: 80 INJECTION, SUSPENSION INTRA-ARTICULAR; INTRAMUSCULAR at 00:00

## 2024-07-31 LAB
ALBUMIN SERPL ELPH-MCNC: 4.2 G/DL
ALP BLD-CCNC: 89 U/L
ALT SERPL-CCNC: 27 U/L
ANION GAP SERPL CALC-SCNC: 12 MMOL/L
AST SERPL-CCNC: 21 U/L
BASOPHILS # BLD AUTO: 0.05 K/UL
BASOPHILS NFR BLD AUTO: 0.9 %
BILIRUB SERPL-MCNC: 0.5 MG/DL
BUN SERPL-MCNC: 17 MG/DL
CALCIUM SERPL-MCNC: 9.9 MG/DL
CHLORIDE SERPL-SCNC: 105 MMOL/L
CO2 SERPL-SCNC: 29 MMOL/L
CREAT SERPL-MCNC: 0.61 MG/DL
CRP SERPL-MCNC: <3 MG/L
EGFR: 102 ML/MIN/1.73M2
EOSINOPHIL # BLD AUTO: 0.32 K/UL
EOSINOPHIL NFR BLD AUTO: 5.4 %
ERYTHROCYTE [SEDIMENTATION RATE] IN BLOOD BY WESTERGREN METHOD: 4 MM/HR
GLUCOSE SERPL-MCNC: 97 MG/DL
HCT VFR BLD CALC: 38.5 %
HGB BLD-MCNC: 11.5 G/DL
LYMPHOCYTES # BLD AUTO: 1.94 K/UL
LYMPHOCYTES NFR BLD AUTO: 32.4 %
MAN DIFF?: NORMAL
MCHC RBC-ENTMCNC: 29.9 GM/DL
MCHC RBC-ENTMCNC: 32.2 PG
MCV RBC AUTO: 107.8 FL
MONOCYTES # BLD AUTO: 0.27 K/UL
MONOCYTES NFR BLD AUTO: 4.5 %
NEUTROPHILS # BLD AUTO: 3.4 K/UL
NEUTROPHILS NFR BLD AUTO: 56.8 %
PLATELET # BLD AUTO: 228 K/UL
POTASSIUM SERPL-SCNC: 5.5 MMOL/L
PROT SERPL-MCNC: 6.3 G/DL
RBC # BLD: 3.57 M/UL
RBC # FLD: 14.3 %
SODIUM SERPL-SCNC: 146 MMOL/L
WBC # FLD AUTO: 5.99 K/UL

## 2024-08-09 ENCOUNTER — APPOINTMENT (OUTPATIENT)
Dept: PULMONOLOGY | Facility: CLINIC | Age: 59
End: 2024-08-09

## 2024-08-09 PROCEDURE — 99213 OFFICE O/P EST LOW 20 MIN: CPT

## 2024-08-09 NOTE — DISCUSSION/SUMMARY
[FreeTextEntry1] : COPD with decrease in smoking.   Candidate for continued screening CT. discussed. STEPHANI on CPAP doing well.  Some issues.  Settings reviewed.

## 2024-08-09 NOTE — PROCEDURE
[FreeTextEntry1] : CPAP data reviewed and discussed.  Compliance good.    CT screening Jan 2024 reviewed 2 mm right upper lobe nodule

## 2024-08-09 NOTE — ASSESSMENT
[FreeTextEntry1] : Continue CPAP. For follow-up CT.  Screening. January 2025 task sent today PFT on RTO.  Follow-up in 6 months or sooner on a as needed basis.
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
## 2024-08-13 ENCOUNTER — TRANSCRIPTION ENCOUNTER (OUTPATIENT)
Age: 59
End: 2024-08-13

## 2024-10-09 ENCOUNTER — APPOINTMENT (OUTPATIENT)
Dept: GASTROENTEROLOGY | Facility: CLINIC | Age: 59
End: 2024-10-09
Payer: MEDICARE

## 2024-10-09 VITALS
OXYGEN SATURATION: 98 % | SYSTOLIC BLOOD PRESSURE: 120 MMHG | DIASTOLIC BLOOD PRESSURE: 70 MMHG | WEIGHT: 112 LBS | TEMPERATURE: 97.8 F | HEIGHT: 61 IN | BODY MASS INDEX: 21.14 KG/M2 | HEART RATE: 78 BPM

## 2024-10-09 DIAGNOSIS — R19.7 DIARRHEA, UNSPECIFIED: ICD-10-CM

## 2024-10-09 DIAGNOSIS — Z72.89 OTHER PROBLEMS RELATED TO LIFESTYLE: ICD-10-CM

## 2024-10-09 PROCEDURE — 99203 OFFICE O/P NEW LOW 30 MIN: CPT

## 2024-10-09 RX ORDER — TIZANIDINE HYDROCHLORIDE 4 MG/1
4 CAPSULE ORAL
Refills: 0 | Status: ACTIVE | COMMUNITY

## 2024-10-10 ENCOUNTER — NON-APPOINTMENT (OUTPATIENT)
Age: 59
End: 2024-10-10

## 2024-10-10 DIAGNOSIS — R74.8 ABNORMAL LEVELS OF OTHER SERUM ENZYMES: ICD-10-CM

## 2024-10-10 LAB
ALBUMIN SERPL ELPH-MCNC: 4 G/DL
ALP BLD-CCNC: 73 U/L
ALT SERPL-CCNC: 44 U/L
ANION GAP SERPL CALC-SCNC: 16 MMOL/L
AST SERPL-CCNC: 41 U/L
BASOPHILS # BLD AUTO: 0.03 K/UL
BASOPHILS NFR BLD AUTO: 0.8 %
BILIRUB SERPL-MCNC: 0.5 MG/DL
BUN SERPL-MCNC: 11 MG/DL
CALCIUM SERPL-MCNC: 9.5 MG/DL
CHLORIDE SERPL-SCNC: 105 MMOL/L
CO2 SERPL-SCNC: 27 MMOL/L
CREAT SERPL-MCNC: 0.63 MG/DL
CRP SERPL-MCNC: 19 MG/L
EGFR: 101 ML/MIN/1.73M2
EOSINOPHIL # BLD AUTO: 0.1 K/UL
EOSINOPHIL NFR BLD AUTO: 2.6 %
GLUCOSE SERPL-MCNC: 89 MG/DL
HCT VFR BLD CALC: 36.4 %
HGB BLD-MCNC: 12 G/DL
IGA SER QL IEP: 137 MG/DL
IMM GRANULOCYTES NFR BLD AUTO: 0.5 %
LYMPHOCYTES # BLD AUTO: 1.29 K/UL
LYMPHOCYTES NFR BLD AUTO: 34 %
MAN DIFF?: NORMAL
MCHC RBC-ENTMCNC: 32 PG
MCHC RBC-ENTMCNC: 33 GM/DL
MCV RBC AUTO: 97.1 FL
MONOCYTES # BLD AUTO: 0.3 K/UL
MONOCYTES NFR BLD AUTO: 7.9 %
NEUTROPHILS # BLD AUTO: 2.05 K/UL
NEUTROPHILS NFR BLD AUTO: 54.2 %
PLATELET # BLD AUTO: 209 K/UL
POTASSIUM SERPL-SCNC: 4.8 MMOL/L
PROT SERPL-MCNC: 6.2 G/DL
RBC # BLD: 3.75 M/UL
RBC # FLD: 13.4 %
SODIUM SERPL-SCNC: 148 MMOL/L
TTG IGA SER IA-ACNC: <0.5 U/ML
TTG IGA SER-ACNC: NEGATIVE
WBC # FLD AUTO: 3.79 K/UL

## 2024-10-10 RX ORDER — SODIUM SULFATE, POTASSIUM SULFATE AND MAGNESIUM SULFATE 1.6; 3.13; 17.5 G/177ML; G/177ML; G/177ML
17.5-3.13-1.6 SOLUTION ORAL
Qty: 2 | Refills: 0 | Status: ACTIVE | COMMUNITY
Start: 2024-10-10 | End: 1900-01-01

## 2024-10-11 RX ORDER — AZITHROMYCIN 500 MG/1
500 TABLET, FILM COATED ORAL DAILY
Qty: 3 | Refills: 0 | Status: ACTIVE | COMMUNITY
Start: 2024-10-11 | End: 1900-01-01

## 2024-10-15 LAB
BACTERIA STL CULT: ABNORMAL
CAMPYLOBACTER: DETECTED
CDIFF BY PCR: NOT DETECTED
DEPRECATED O AND P PREP STL: NORMAL
GI PCR PANEL: DETECTED

## 2024-10-16 LAB — CALPROTECTIN FECAL: 778 UG/G

## 2024-10-17 ENCOUNTER — APPOINTMENT (OUTPATIENT)
Dept: GASTROENTEROLOGY | Facility: AMBULATORY MEDICAL SERVICES | Age: 59
End: 2024-10-17
Payer: MEDICARE

## 2024-10-17 ENCOUNTER — TRANSCRIPTION ENCOUNTER (OUTPATIENT)
Age: 59
End: 2024-10-17

## 2024-10-17 PROCEDURE — 45378 DIAGNOSTIC COLONOSCOPY: CPT | Mod: PT

## 2024-11-05 ENCOUNTER — APPOINTMENT (OUTPATIENT)
Dept: RHEUMATOLOGY | Facility: CLINIC | Age: 59
End: 2024-11-05
Payer: MEDICARE

## 2024-11-05 VITALS
DIASTOLIC BLOOD PRESSURE: 77 MMHG | BODY MASS INDEX: 22.47 KG/M2 | HEART RATE: 67 BPM | SYSTOLIC BLOOD PRESSURE: 128 MMHG | HEIGHT: 61 IN | WEIGHT: 119 LBS | OXYGEN SATURATION: 98 %

## 2024-11-05 PROCEDURE — 96372 THER/PROPH/DIAG INJ SC/IM: CPT

## 2024-11-05 PROCEDURE — 99215 OFFICE O/P EST HI 40 MIN: CPT | Mod: 25

## 2024-11-05 RX ORDER — TRIAMCINOLONE ACETONIDE 80 MG/ML
80 INJECTION, SUSPENSION INTRA-ARTICULAR; INTRAMUSCULAR
Qty: 8 | Refills: 0 | Status: COMPLETED | OUTPATIENT
Start: 2024-11-05

## 2024-11-05 RX ADMIN — TRIAMCINOLONE ACETONIDE 0 MG/ML: 80 INJECTION, SUSPENSION INTRA-ARTICULAR; INTRAMUSCULAR at 00:00

## 2024-11-06 LAB
ALBUMIN SERPL ELPH-MCNC: 4.4 G/DL
ALP BLD-CCNC: 86 U/L
ALT SERPL-CCNC: 17 U/L
ANION GAP SERPL CALC-SCNC: 10 MMOL/L
AST SERPL-CCNC: 19 U/L
BASOPHILS # BLD AUTO: 0.02 K/UL
BASOPHILS NFR BLD AUTO: 0.2 %
BILIRUB SERPL-MCNC: 0.5 MG/DL
BUN SERPL-MCNC: 18 MG/DL
CALCIUM SERPL-MCNC: 9 MG/DL
CHLORIDE SERPL-SCNC: 105 MMOL/L
CO2 SERPL-SCNC: 28 MMOL/L
CREAT SERPL-MCNC: 0.62 MG/DL
CRP SERPL-MCNC: <3 MG/L
EGFR: 101 ML/MIN/1.73M2
EOSINOPHIL # BLD AUTO: 0.25 K/UL
EOSINOPHIL NFR BLD AUTO: 2.4 %
ERYTHROCYTE [SEDIMENTATION RATE] IN BLOOD BY WESTERGREN METHOD: 6 MM/HR
GLUCOSE SERPL-MCNC: 91 MG/DL
HAV IGM SER QL: NONREACTIVE
HBV CORE IGM SER QL: NONREACTIVE
HBV SURFACE AG SER QL: NONREACTIVE
HCT VFR BLD CALC: 39.5 %
HCV AB SER QL: NONREACTIVE
HCV S/CO RATIO: 0.15 S/CO
HGB BLD-MCNC: 12 G/DL
IMM GRANULOCYTES NFR BLD AUTO: 0.4 %
LYMPHOCYTES # BLD AUTO: 1.61 K/UL
LYMPHOCYTES NFR BLD AUTO: 15.8 %
MAN DIFF?: NORMAL
MCHC RBC-ENTMCNC: 30.4 G/DL
MCHC RBC-ENTMCNC: 32.2 PG
MCV RBC AUTO: 105.9 FL
MONOCYTES # BLD AUTO: 0.55 K/UL
MONOCYTES NFR BLD AUTO: 5.4 %
NEUTROPHILS # BLD AUTO: 7.74 K/UL
NEUTROPHILS NFR BLD AUTO: 75.8 %
PLATELET # BLD AUTO: 230 K/UL
POTASSIUM SERPL-SCNC: 4.8 MMOL/L
PROT SERPL-MCNC: 6.4 G/DL
RBC # BLD: 3.73 M/UL
RBC # FLD: 14.5 %
SODIUM SERPL-SCNC: 143 MMOL/L
WBC # FLD AUTO: 10.21 K/UL

## 2024-11-09 LAB
M TB IFN-G BLD-IMP: NEGATIVE
QUANTIFERON TB PLUS MITOGEN MINUS NIL: 4.18 IU/ML
QUANTIFERON TB PLUS NIL: 0.02 IU/ML
QUANTIFERON TB PLUS TB1 MINUS NIL: 0 IU/ML
QUANTIFERON TB PLUS TB2 MINUS NIL: 0 IU/ML

## 2024-12-17 ENCOUNTER — TRANSCRIPTION ENCOUNTER (OUTPATIENT)
Age: 59
End: 2024-12-17

## 2025-01-23 ENCOUNTER — NON-APPOINTMENT (OUTPATIENT)
Age: 60
End: 2025-01-23

## 2025-01-23 VITALS — BODY MASS INDEX: 22.47 KG/M2 | HEIGHT: 61 IN | WEIGHT: 119 LBS

## 2025-01-23 DIAGNOSIS — Z80.1 FAMILY HISTORY OF MALIGNANT NEOPLASM OF TRACHEA, BRONCHUS AND LUNG: ICD-10-CM

## 2025-01-23 DIAGNOSIS — F17.200 NICOTINE DEPENDENCE, UNSPECIFIED, UNCOMPLICATED: ICD-10-CM

## 2025-01-23 DIAGNOSIS — R91.1 SOLITARY PULMONARY NODULE: ICD-10-CM

## 2025-01-24 ENCOUNTER — APPOINTMENT (OUTPATIENT)
Dept: CT IMAGING | Facility: CLINIC | Age: 60
End: 2025-01-24

## 2025-01-24 ENCOUNTER — OUTPATIENT (OUTPATIENT)
Dept: OUTPATIENT SERVICES | Facility: HOSPITAL | Age: 60
LOS: 1 days | End: 2025-01-24
Payer: MEDICARE

## 2025-01-24 DIAGNOSIS — M94.20 CHONDROMALACIA, UNSPECIFIED SITE: Chronic | ICD-10-CM

## 2025-01-24 DIAGNOSIS — C44.92 SQUAMOUS CELL CARCINOMA OF SKIN, UNSPECIFIED: Chronic | ICD-10-CM

## 2025-01-24 DIAGNOSIS — N80.0 ENDOMETRIOSIS OF UTERUS: Chronic | ICD-10-CM

## 2025-01-24 DIAGNOSIS — F17.200 NICOTINE DEPENDENCE, UNSPECIFIED, UNCOMPLICATED: ICD-10-CM

## 2025-01-24 DIAGNOSIS — M77.9 ENTHESOPATHY, UNSPECIFIED: Chronic | ICD-10-CM

## 2025-01-24 DIAGNOSIS — M26.51 ABNORMAL JAW CLOSURE: Chronic | ICD-10-CM

## 2025-01-24 DIAGNOSIS — R29.898 OTHER SYMPTOMS AND SIGNS INVOLVING THE MUSCULOSKELETAL SYSTEM: Chronic | ICD-10-CM

## 2025-01-24 PROCEDURE — 71271 CT THORAX LUNG CANCER SCR C-: CPT

## 2025-01-24 PROCEDURE — 71271 CT THORAX LUNG CANCER SCR C-: CPT | Mod: 26

## 2025-02-04 ENCOUNTER — APPOINTMENT (OUTPATIENT)
Dept: RHEUMATOLOGY | Facility: CLINIC | Age: 60
End: 2025-02-04
Payer: MEDICARE

## 2025-02-04 VITALS
OXYGEN SATURATION: 97 % | WEIGHT: 119 LBS | SYSTOLIC BLOOD PRESSURE: 120 MMHG | HEIGHT: 61 IN | BODY MASS INDEX: 22.47 KG/M2 | DIASTOLIC BLOOD PRESSURE: 78 MMHG | HEART RATE: 81 BPM

## 2025-02-04 PROCEDURE — 99214 OFFICE O/P EST MOD 30 MIN: CPT | Mod: 25

## 2025-02-04 PROCEDURE — 96372 THER/PROPH/DIAG INJ SC/IM: CPT

## 2025-02-04 RX ORDER — TRIAMCINOLONE ACETONIDE 80 MG/ML
80 INJECTION, SUSPENSION INTRA-ARTICULAR; INTRAMUSCULAR
Qty: 8 | Refills: 0 | Status: COMPLETED | OUTPATIENT
Start: 2025-02-04

## 2025-02-04 RX ADMIN — TRIAMCINOLONE ACETONIDE 0 MG/ML: 80 INJECTION, SUSPENSION INTRA-ARTICULAR; INTRAMUSCULAR at 00:00

## 2025-02-05 LAB
ALBUMIN SERPL ELPH-MCNC: 4.2 G/DL
ALP BLD-CCNC: 85 U/L
ALT SERPL-CCNC: 15 U/L
ANION GAP SERPL CALC-SCNC: 11 MMOL/L
AST SERPL-CCNC: 17 U/L
BASOPHILS # BLD AUTO: 0.02 K/UL
BASOPHILS NFR BLD AUTO: 0.2 %
BILIRUB SERPL-MCNC: 0.4 MG/DL
BUN SERPL-MCNC: 13 MG/DL
CALCIUM SERPL-MCNC: 9.1 MG/DL
CHLORIDE SERPL-SCNC: 107 MMOL/L
CO2 SERPL-SCNC: 27 MMOL/L
CREAT SERPL-MCNC: 0.68 MG/DL
CRP SERPL-MCNC: <3 MG/L
EGFR: 99 ML/MIN/1.73M2
EOSINOPHIL # BLD AUTO: 0.2 K/UL
EOSINOPHIL NFR BLD AUTO: 2.3 %
ERYTHROCYTE [SEDIMENTATION RATE] IN BLOOD BY WESTERGREN METHOD: 10 MM/HR
GLUCOSE SERPL-MCNC: 101 MG/DL
HCT VFR BLD CALC: 36.9 %
HGB BLD-MCNC: 11.9 G/DL
IMM GRANULOCYTES NFR BLD AUTO: 0.2 %
LYMPHOCYTES # BLD AUTO: 1.29 K/UL
LYMPHOCYTES NFR BLD AUTO: 14.6 %
MAN DIFF?: NORMAL
MCHC RBC-ENTMCNC: 32.2 G/DL
MCHC RBC-ENTMCNC: 32.9 PG
MCV RBC AUTO: 101.9 FL
MONOCYTES # BLD AUTO: 0.53 K/UL
MONOCYTES NFR BLD AUTO: 6 %
NEUTROPHILS # BLD AUTO: 6.78 K/UL
NEUTROPHILS NFR BLD AUTO: 76.7 %
PLATELET # BLD AUTO: 242 K/UL
POTASSIUM SERPL-SCNC: 5 MMOL/L
PROT SERPL-MCNC: 6.1 G/DL
RBC # BLD: 3.62 M/UL
RBC # FLD: 13.5 %
SODIUM SERPL-SCNC: 145 MMOL/L
WBC # FLD AUTO: 8.84 K/UL

## 2025-02-11 ENCOUNTER — APPOINTMENT (OUTPATIENT)
Dept: PULMONOLOGY | Facility: CLINIC | Age: 60
End: 2025-02-11
Payer: MEDICARE

## 2025-02-11 VITALS — OXYGEN SATURATION: 96 % | DIASTOLIC BLOOD PRESSURE: 85 MMHG | SYSTOLIC BLOOD PRESSURE: 149 MMHG | HEART RATE: 78 BPM

## 2025-02-11 DIAGNOSIS — F17.200 NICOTINE DEPENDENCE, UNSPECIFIED, UNCOMPLICATED: ICD-10-CM

## 2025-02-11 DIAGNOSIS — G47.33 OBSTRUCTIVE SLEEP APNEA (ADULT) (PEDIATRIC): ICD-10-CM

## 2025-02-11 DIAGNOSIS — J44.9 CHRONIC OBSTRUCTIVE PULMONARY DISEASE, UNSPECIFIED: ICD-10-CM

## 2025-02-11 DIAGNOSIS — I25.10 ATHEROSCLEROTIC HEART DISEASE OF NATIVE CORONARY ARTERY W/OUT ANGINA PECTORIS: ICD-10-CM

## 2025-02-11 PROCEDURE — ZZZZZ: CPT

## 2025-02-11 PROCEDURE — 94727 GAS DIL/WSHOT DETER LNG VOL: CPT

## 2025-02-11 PROCEDURE — 99214 OFFICE O/P EST MOD 30 MIN: CPT | Mod: 25

## 2025-02-11 PROCEDURE — 94729 DIFFUSING CAPACITY: CPT

## 2025-02-11 PROCEDURE — G0296 VISIT TO DETERM LDCT ELIG: CPT

## 2025-02-11 PROCEDURE — 94010 BREATHING CAPACITY TEST: CPT

## 2025-02-21 ENCOUNTER — TRANSCRIPTION ENCOUNTER (OUTPATIENT)
Age: 60
End: 2025-02-21

## 2025-03-18 ENCOUNTER — APPOINTMENT (OUTPATIENT)
Dept: MAMMOGRAPHY | Facility: CLINIC | Age: 60
End: 2025-03-18
Payer: MEDICARE

## 2025-03-18 ENCOUNTER — APPOINTMENT (OUTPATIENT)
Dept: ULTRASOUND IMAGING | Facility: CLINIC | Age: 60
End: 2025-03-18
Payer: MEDICARE

## 2025-03-18 ENCOUNTER — OUTPATIENT (OUTPATIENT)
Dept: OUTPATIENT SERVICES | Facility: HOSPITAL | Age: 60
LOS: 1 days | End: 2025-03-18
Payer: MEDICARE

## 2025-03-18 DIAGNOSIS — C44.92 SQUAMOUS CELL CARCINOMA OF SKIN, UNSPECIFIED: Chronic | ICD-10-CM

## 2025-03-18 DIAGNOSIS — N80.0 ENDOMETRIOSIS OF UTERUS: Chronic | ICD-10-CM

## 2025-03-18 DIAGNOSIS — Z00.00 ENCOUNTER FOR GENERAL ADULT MEDICAL EXAMINATION WITHOUT ABNORMAL FINDINGS: ICD-10-CM

## 2025-03-18 DIAGNOSIS — M94.20 CHONDROMALACIA, UNSPECIFIED SITE: Chronic | ICD-10-CM

## 2025-03-18 DIAGNOSIS — M26.51 ABNORMAL JAW CLOSURE: Chronic | ICD-10-CM

## 2025-03-18 DIAGNOSIS — M77.9 ENTHESOPATHY, UNSPECIFIED: Chronic | ICD-10-CM

## 2025-03-18 DIAGNOSIS — R29.898 OTHER SYMPTOMS AND SIGNS INVOLVING THE MUSCULOSKELETAL SYSTEM: Chronic | ICD-10-CM

## 2025-03-18 PROCEDURE — 77063 BREAST TOMOSYNTHESIS BI: CPT | Mod: 26

## 2025-03-18 PROCEDURE — 77067 SCR MAMMO BI INCL CAD: CPT | Mod: 26

## 2025-03-18 PROCEDURE — 77063 BREAST TOMOSYNTHESIS BI: CPT

## 2025-03-18 PROCEDURE — 77067 SCR MAMMO BI INCL CAD: CPT

## 2025-04-23 ENCOUNTER — NON-APPOINTMENT (OUTPATIENT)
Age: 60
End: 2025-04-23

## 2025-05-01 ENCOUNTER — NON-APPOINTMENT (OUTPATIENT)
Age: 60
End: 2025-05-01

## 2025-05-06 ENCOUNTER — APPOINTMENT (OUTPATIENT)
Dept: RHEUMATOLOGY | Facility: CLINIC | Age: 60
End: 2025-05-06
Payer: MEDICARE

## 2025-05-06 VITALS
OXYGEN SATURATION: 99 % | WEIGHT: 119 LBS | HEIGHT: 61 IN | HEART RATE: 70 BPM | DIASTOLIC BLOOD PRESSURE: 76 MMHG | BODY MASS INDEX: 22.47 KG/M2 | SYSTOLIC BLOOD PRESSURE: 132 MMHG

## 2025-05-06 PROCEDURE — 96372 THER/PROPH/DIAG INJ SC/IM: CPT

## 2025-05-06 PROCEDURE — 99214 OFFICE O/P EST MOD 30 MIN: CPT | Mod: 25

## 2025-05-06 RX ORDER — TRIAMCINOLONE ACETONIDE 80 MG/ML
80 INJECTION, SUSPENSION INTRA-ARTICULAR; INTRAMUSCULAR
Qty: 8 | Refills: 0 | Status: COMPLETED | OUTPATIENT
Start: 2025-05-06

## 2025-05-06 RX ORDER — TROSPIUM CHLORIDE 20 MG/1
20 TABLET, FILM COATED ORAL
Refills: 0 | Status: ACTIVE | COMMUNITY

## 2025-05-06 RX ADMIN — TRIAMCINOLONE ACETONIDE 0 MG/ML: 80 INJECTION, SUSPENSION INTRA-ARTICULAR; INTRAMUSCULAR at 00:00

## 2025-05-07 LAB
ALBUMIN SERPL ELPH-MCNC: 4.4 G/DL
ALP BLD-CCNC: 93 U/L
ALT SERPL-CCNC: 20 U/L
ANION GAP SERPL CALC-SCNC: 11 MMOL/L
AST SERPL-CCNC: 22 U/L
BASOPHILS # BLD AUTO: 0.01 K/UL
BASOPHILS NFR BLD AUTO: 0.1 %
BILIRUB SERPL-MCNC: 0.5 MG/DL
BUN SERPL-MCNC: 13 MG/DL
CALCIUM SERPL-MCNC: 9.1 MG/DL
CHLORIDE SERPL-SCNC: 106 MMOL/L
CO2 SERPL-SCNC: 26 MMOL/L
CREAT SERPL-MCNC: 0.63 MG/DL
CRP SERPL-MCNC: <3 MG/L
EGFRCR SERPLBLD CKD-EPI 2021: 100 ML/MIN/1.73M2
EOSINOPHIL # BLD AUTO: 0.22 K/UL
EOSINOPHIL NFR BLD AUTO: 2.7 %
ERYTHROCYTE [SEDIMENTATION RATE] IN BLOOD BY WESTERGREN METHOD: 6 MM/HR
GLUCOSE SERPL-MCNC: 92 MG/DL
HCT VFR BLD CALC: 37.2 %
HGB BLD-MCNC: 11.9 G/DL
IMM GRANULOCYTES NFR BLD AUTO: 0.2 %
LYMPHOCYTES # BLD AUTO: 1.52 K/UL
LYMPHOCYTES NFR BLD AUTO: 18.3 %
MAN DIFF?: NORMAL
MCHC RBC-ENTMCNC: 32 G/DL
MCHC RBC-ENTMCNC: 33.1 PG
MCV RBC AUTO: 103.6 FL
MONOCYTES # BLD AUTO: 0.6 K/UL
MONOCYTES NFR BLD AUTO: 7.2 %
NEUTROPHILS # BLD AUTO: 5.93 K/UL
NEUTROPHILS NFR BLD AUTO: 71.5 %
PLATELET # BLD AUTO: 234 K/UL
POTASSIUM SERPL-SCNC: 4.8 MMOL/L
PROT SERPL-MCNC: 6.3 G/DL
RBC # BLD: 3.59 M/UL
RBC # FLD: 13.9 %
SODIUM SERPL-SCNC: 143 MMOL/L
WBC # FLD AUTO: 8.3 K/UL

## 2025-08-02 ENCOUNTER — RX RENEWAL (OUTPATIENT)
Age: 60
End: 2025-08-02

## 2025-08-26 ENCOUNTER — APPOINTMENT (OUTPATIENT)
Dept: RHEUMATOLOGY | Facility: CLINIC | Age: 60
End: 2025-08-26
Payer: MEDICARE

## 2025-08-26 VITALS
DIASTOLIC BLOOD PRESSURE: 83 MMHG | SYSTOLIC BLOOD PRESSURE: 143 MMHG | HEART RATE: 74 BPM | WEIGHT: 118 LBS | BODY MASS INDEX: 22.28 KG/M2 | OXYGEN SATURATION: 98 % | HEIGHT: 61 IN

## 2025-08-26 PROCEDURE — 99214 OFFICE O/P EST MOD 30 MIN: CPT | Mod: 25

## 2025-08-26 PROCEDURE — 96372 THER/PROPH/DIAG INJ SC/IM: CPT

## 2025-08-26 RX ORDER — TRIAMCINOLONE ACETONIDE 80 MG/ML
80 INJECTION, SUSPENSION INTRA-ARTICULAR; INTRAMUSCULAR
Qty: 8 | Refills: 0 | Status: COMPLETED | OUTPATIENT
Start: 2025-08-26

## 2025-08-26 RX ADMIN — TRIAMCINOLONE ACETONIDE 0 MG/ML: 80 INJECTION, SUSPENSION INTRA-ARTICULAR; INTRAMUSCULAR at 00:00
